# Patient Record
Sex: MALE | Race: BLACK OR AFRICAN AMERICAN | NOT HISPANIC OR LATINO | Employment: OTHER | URBAN - METROPOLITAN AREA
[De-identification: names, ages, dates, MRNs, and addresses within clinical notes are randomized per-mention and may not be internally consistent; named-entity substitution may affect disease eponyms.]

---

## 2017-04-10 ENCOUNTER — OFFICE VISIT (OUTPATIENT)
Dept: FAMILY MEDICINE | Facility: CLINIC | Age: 47
End: 2017-04-10
Payer: COMMERCIAL

## 2017-04-10 VITALS
HEIGHT: 72 IN | DIASTOLIC BLOOD PRESSURE: 110 MMHG | TEMPERATURE: 98 F | HEART RATE: 68 BPM | WEIGHT: 255.5 LBS | SYSTOLIC BLOOD PRESSURE: 156 MMHG | BODY MASS INDEX: 34.61 KG/M2

## 2017-04-10 DIAGNOSIS — I10 ESSENTIAL HYPERTENSION: Primary | ICD-10-CM

## 2017-04-10 PROCEDURE — 99204 OFFICE O/P NEW MOD 45 MIN: CPT | Mod: S$GLB,,, | Performed by: FAMILY MEDICINE

## 2017-04-10 PROCEDURE — 99999 PR PBB SHADOW E&M-EST. PATIENT-LVL III: CPT | Mod: PBBFAC,,, | Performed by: FAMILY MEDICINE

## 2017-04-10 PROCEDURE — 3080F DIAST BP >= 90 MM HG: CPT | Mod: S$GLB,,, | Performed by: FAMILY MEDICINE

## 2017-04-10 PROCEDURE — 3077F SYST BP >= 140 MM HG: CPT | Mod: S$GLB,,, | Performed by: FAMILY MEDICINE

## 2017-04-10 PROCEDURE — 1160F RVW MEDS BY RX/DR IN RCRD: CPT | Mod: S$GLB,,, | Performed by: FAMILY MEDICINE

## 2017-04-10 RX ORDER — LOSARTAN POTASSIUM AND HYDROCHLOROTHIAZIDE 25; 100 MG/1; MG/1
1 TABLET ORAL DAILY
Qty: 90 TABLET | Refills: 3 | Status: ON HOLD | OUTPATIENT
Start: 2017-04-10 | End: 2020-03-26 | Stop reason: HOSPADM

## 2017-04-10 NOTE — PROGRESS NOTES
Subjective:       Patient ID: Chon Abreu is a 46 y.o. male.    Chief Complaint: Blood Pressure Check    HPI Comments: Disclaimer: This note has been generated using voice-recognition software. There may be typographical errors that have been missed during proof-reading    45 yo presents for follow up after hospital discharge and to establish care with me as new PCP.  Previously seen in 2014.  Admitted at [facility] x one day one week ago when he developed positional vertigo which lasted over one hour.  By history had labs, EKG which were normal (no results available for review).  He also had head CT which was reportedly normal  Given prescription for vertigo, which he never started since symptoms resolved  Review of previous visits showed EKG with LVH    Review of Systems   Constitutional: Negative for activity change, appetite change, chills, fatigue, fever and unexpected weight change.   HENT: Negative for congestion, ear discharge, ear pain, hearing loss and sinus pressure.    Eyes: Negative for pain and visual disturbance.   Respiratory: Negative for cough, chest tightness and shortness of breath.    Cardiovascular: Negative for chest pain, palpitations and leg swelling.   Gastrointestinal: Negative for abdominal distention and abdominal pain.   Genitourinary: Negative for difficulty urinating, dysuria, frequency and hematuria.   Musculoskeletal: Negative for arthralgias, back pain, gait problem, joint swelling, myalgias, neck pain and neck stiffness.   Skin: Negative for rash.   Neurological: Negative for dizziness, tremors, speech difficulty, weakness, numbness and headaches.   Psychiatric/Behavioral: Negative for agitation and behavioral problems.       Objective:      Physical Exam   Constitutional: He is oriented to person, place, and time. He appears well-developed and well-nourished.   HENT:   Head: Normocephalic.   Right Ear: Tympanic membrane and external ear normal.   Left Ear: Tympanic  membrane and external ear normal.   Nose: Nose normal.   Mouth/Throat: Uvula is midline, oropharynx is clear and moist and mucous membranes are normal.   Eyes: Conjunctivae and EOM are normal. Pupils are equal, round, and reactive to light.   Neck: Normal range of motion. Neck supple. No JVD present. Carotid bruit is not present. No thyroid mass and no thyromegaly present.   Cardiovascular: Normal rate, regular rhythm and normal heart sounds.    No murmur heard.  Pulmonary/Chest: Effort normal and breath sounds normal. He has no rales.   Abdominal: Soft. Bowel sounds are normal. He exhibits no mass. There is no tenderness.   Genitourinary: Testes normal. Right testis shows no mass.   Musculoskeletal: Normal range of motion. He exhibits no edema.   Lymphadenopathy:     He has no cervical adenopathy.   Neurological: He is alert and oriented to person, place, and time. He has normal reflexes. No cranial nerve deficit.   Skin: Skin is warm. No lesion and no rash noted.   Psychiatric: He has a normal mood and affect.       Assessment:       1.  Essential hypertension  2.  LVH  3.  Transient vertigo  Plan:       1.  Losartan-HCT  2.  Sign release form to obtain Hospital records for review  3.  F/u 3 weeks

## 2017-04-10 NOTE — MR AVS SNAPSHOT
[facility]  [address] [facility], [address]  [facility] [address]  Phone: [phone]  Fax: [phone]                  Chon Abreu   4/10/2017 8:20 AM   Office Visit    Description:  Male : 1970   Provider:  Thee Donato MD   Department:  [facility]           Reason for Visit     Blood Pressure Check           Diagnoses this Visit        Comments    Essential hypertension    -  Primary            To Do List           Goals (5 Years of Data)     None      Follow-Up and Disposition     Return in about 3 weeks (around 2017).       These Medications        Disp Refills Start End    losartan-hydrochlorothiazide 100-25 mg (HYZAAR) 100-25 mg per tablet 90 tablet 3 4/10/2017 4/10/2018    Take 1 tablet by mouth once daily. - Oral    Pharmacy: [facility] [address] [place] [address] [address] [facility] AT [facility] Ph #: [phone]         [facility]     [facility] On Call Nurse Care Line -  Assistance  Unless otherwise directed by your provider, please contact [facility], our nurse care line that is available for  assistance.     Registered nurses in the [facility] provide: appointment scheduling, clinical advisement, health education, and other advisory services.  Call: [phone] (toll free)               Medications           START taking these NEW medications        Refills    losartan-hydrochlorothiazide 100-25 mg (HYZAAR) 100-25 mg per tablet 3    Sig: Take 1 tablet by mouth once daily.    Class: Normal    Route: Oral      STOP taking these medications     naproxen (NAPROSYN) 500 MG tablet Take 1 tablet (500 mg total) by mouth 2 (two) times daily with meals.           Verify that the below list of medications is an accurate representation of the medications you are currently taking.  If none reported, the list may be blank. If incorrect, please contact your healthcare provider. Carry this  "list with you in case of emergency.           Current Medications     losartan-hydrochlorothiazide 100-25 mg (HYZAAR) 100-25 mg per tablet Take 1 tablet by mouth once daily.           Clinical Reference Information           Your Vitals Were     BP Pulse Temp    156/110 (BP Location: Right arm, Patient Position: Sitting, BP Method: Manual) 68 98.1 °F (36.7 °C) (Oral)    Height Weight BMI    5' 11.5" (1.816 m) 115.9 kg (255 lb 8.2 oz) 35.14 kg/m2      Blood Pressure          Most Recent Value    BP  (!)  156/110      Allergies as of 4/10/2017     No Known Allergies      Immunizations Administered on Date of Encounter - 4/10/2017     None      Language Assistance Services     ATTENTION: Language assistance services are available, free of charge. Please call [phone].      ATENCIÓN: Si redd leonardo, tiene a norris disposición servicios gratuitos de asistencia lingüística. Llame al [phone].     CHÚ Ý: N?u b?n nói Ti?ng Vi?t, có các d?ch v? h? tr? ngôn ng? mi?n phí dành cho b?n. G?i s? [phone].         [facility] complies with applicable Federal civil rights laws and does not discriminate on the basis of race, color, national origin, age, disability, or sex.        "

## 2017-05-01 ENCOUNTER — OFFICE VISIT (OUTPATIENT)
Dept: FAMILY MEDICINE | Facility: CLINIC | Age: 47
End: 2017-05-01
Payer: COMMERCIAL

## 2017-05-01 VITALS
DIASTOLIC BLOOD PRESSURE: 100 MMHG | HEART RATE: 72 BPM | HEIGHT: 71 IN | SYSTOLIC BLOOD PRESSURE: 160 MMHG | BODY MASS INDEX: 34.69 KG/M2 | TEMPERATURE: 99 F | WEIGHT: 247.81 LBS

## 2017-05-01 DIAGNOSIS — I10 ESSENTIAL HYPERTENSION: Primary | ICD-10-CM

## 2017-05-01 PROCEDURE — 99999 PR PBB SHADOW E&M-EST. PATIENT-LVL III: CPT | Mod: PBBFAC,,, | Performed by: FAMILY MEDICINE

## 2017-05-01 PROCEDURE — 1160F RVW MEDS BY RX/DR IN RCRD: CPT | Mod: S$GLB,,, | Performed by: FAMILY MEDICINE

## 2017-05-01 PROCEDURE — 3077F SYST BP >= 140 MM HG: CPT | Mod: S$GLB,,, | Performed by: FAMILY MEDICINE

## 2017-05-01 PROCEDURE — 99214 OFFICE O/P EST MOD 30 MIN: CPT | Mod: S$GLB,,, | Performed by: FAMILY MEDICINE

## 2017-05-01 PROCEDURE — 3080F DIAST BP >= 90 MM HG: CPT | Mod: S$GLB,,, | Performed by: FAMILY MEDICINE

## 2017-05-01 RX ORDER — AMLODIPINE BESYLATE 10 MG/1
10 TABLET ORAL DAILY
Qty: 30 TABLET | Refills: 11 | Status: ON HOLD | OUTPATIENT
Start: 2017-05-01 | End: 2020-03-26 | Stop reason: HOSPADM

## 2017-05-01 NOTE — MR AVS SNAPSHOT
[facility]  [address] [facility], [address]  [facility] [address]  Phone: [phone]  Fax: [phone]                  Chon Abreu   2017 9:40 AM   Office Visit    Description:  Male : 1970   Provider:  Thee Donato MD   Department:  [facility]           Reason for Visit     Hypertension           Diagnoses this Visit        Comments    Essential hypertension    -  Primary            To Do List           Goals (5 Years of Data)     None      Follow-Up and Disposition     Return in about 4 weeks (around 2017).       These Medications        Disp Refills Start End    amlodipine (NORVASC) 10 MG tablet 30 tablet 11 2017    Take 1 tablet (10 mg total) by mouth once daily. - Oral    Pharmacy: [facility] [address] [place] [address] AT [facility] #: [phone]         [facility]     [facility] On Call Nurse Care Line -  Assistance  Unless otherwise directed by your provider, please contact [facility], our nurse care line that is available for  assistance.     Registered nurses in the [facility] On Call Center provide: appointment scheduling, clinical advisement, health education, and other advisory services.  Call: [phone] (toll free)               Medications           START taking these NEW medications        Refills    amlodipine (NORVASC) 10 MG tablet 11    Sig: Take 1 tablet (10 mg total) by mouth once daily.    Class: Normal    Route: Oral           Verify that the below list of medications is an accurate representation of the medications you are currently taking.  If none reported, the list may be blank. If incorrect, please contact your healthcare provider. Carry this list with you in case of emergency.           Current Medications     losartan-hydrochlorothiazide 100-25 mg (HYZAAR) 100-25 mg per tablet Take 1 tablet by mouth once daily.    amlodipine  "(NORVASC) 10 MG tablet Take 1 tablet (10 mg total) by mouth once daily.           Clinical Reference Information           Your Vitals Were     BP Pulse Temp    160/100 (BP Location: Right arm, Patient Position: Sitting, BP Method: Manual) 72 98.7 °F (37.1 °C) (Oral)    Height Weight BMI    5' 11" (1.803 m) 112.4 kg (247 lb 12.8 oz) 34.56 kg/m2      Blood Pressure          Most Recent Value    BP  (!)  160/100      Allergies as of 5/1/2017     No Known Allergies      Immunizations Administered on Date of Encounter - 5/1/2017     None      Language Assistance Services     ATTENTION: Language assistance services are available, free of charge. Please call [phone].      ATENCIÓN: Si chandrikala malissa, tiene a norris disposición servicios gratuitos de asistencia lingüística. Llame al [phone].     ELIEL Ý: N?u b?n nói Ti?ng Vi?t, có các d?ch v? h? tr? ngôn ng? mi?n phí dành cho b?n. G?i s? [phone].         [facility] complies with applicable Federal civil rights laws and does not discriminate on the basis of race, color, national origin, age, disability, or sex.        "

## 2017-05-01 NOTE — PROGRESS NOTES
Subjective:       Patient ID: Chon Abreu is a 46 y.o. male.    Chief Complaint: Hypertension (Follow up)    HPI Comments: Disclaimer: This note has been generated using voice-recognition software. There may be typographical errors that have been missed during proof-reading    Pt presents for follow up of hypertension.  Taking medication without side effects.  BP checked 2x since last seen showed systolics around 170s, diastolics in 100s  No copies of prior records available for review    Hypertension   Pertinent negatives include no chest pain, headaches, palpitations or shortness of breath.     Review of Systems   Constitutional: Negative for fatigue and unexpected weight change.   Respiratory: Negative for chest tightness and shortness of breath.    Cardiovascular: Negative for chest pain, palpitations and leg swelling.   Neurological: Negative for dizziness, weakness, light-headedness and headaches.       Objective:      Physical Exam   Constitutional: He appears well-developed and well-nourished. No distress.   Neck: No JVD present. No thyromegaly present.   Cardiovascular: Normal rate and regular rhythm.    No murmur heard.  Pulmonary/Chest: Effort normal and breath sounds normal. He has no wheezes. He has no rales.   Musculoskeletal: He exhibits no edema.   Lymphadenopathy:     He has no cervical adenopathy.       Assessment:       1. Essential hypertension        Plan:       1.  Continue present medications  2.  Amlodipine 10mg daily  3.  Try to obtain prior records for review  4.  Frequent BP checks, then f/u one month

## 2017-05-05 ENCOUNTER — TELEPHONE (OUTPATIENT)
Dept: FAMILY MEDICINE | Facility: CLINIC | Age: 47
End: 2017-05-05

## 2017-05-05 NOTE — TELEPHONE ENCOUNTER
----- Message from Yola Kim LPN sent at 5/1/2017 10:24 AM CDT -----  Call tomorrow to set up nurse visits for b/p checks.

## 2017-05-05 NOTE — TELEPHONE ENCOUNTER
Attempted to contact patient to schedule nurse visits for blood pressure checks. Message left on voicemail instructing patient to return call.

## 2017-05-11 NOTE — TELEPHONE ENCOUNTER
Attempted to contact patient to schedule nurse visits for blood pressure checks. Unable to leave message at number provided.

## 2017-05-11 NOTE — TELEPHONE ENCOUNTER
Message left on voicemail of emergency contact to have patient contact office to schedule nurse visits.

## 2017-10-25 ENCOUNTER — HOSPITAL ENCOUNTER (EMERGENCY)
Facility: OTHER | Age: 47
Discharge: HOME | End: 2017-10-26
Attending: EMERGENCY MEDICINE
Payer: COMMERCIAL

## 2017-10-25 DIAGNOSIS — I10 HYPERTENSION, UNSPECIFIED TYPE: ICD-10-CM

## 2017-10-25 DIAGNOSIS — I10 ESSENTIAL HYPERTENSION: ICD-10-CM

## 2017-10-25 DIAGNOSIS — R42 DIZZINESS: Primary | ICD-10-CM

## 2017-10-25 LAB
ANION GAP SERPL CALC-SCNC: 7 MMOL/L
BASOPHILS # BLD AUTO: 0.01 K/UL
BASOPHILS NFR BLD: 0.3 %
BUN SERPL-MCNC: 17 MG/DL
CALCIUM SERPL-MCNC: 8.9 MG/DL
CHLORIDE SERPL-SCNC: 106 MMOL/L
CO2 SERPL-SCNC: 29 MMOL/L
CREAT SERPL-MCNC: 0.9 MG/DL
DIFFERENTIAL METHOD: ABNORMAL
EOSINOPHIL # BLD AUTO: 0.2 K/UL
EOSINOPHIL NFR BLD: 5.5 %
ERYTHROCYTE [DISTWIDTH] IN BLOOD BY AUTOMATED COUNT: 13.6 %
EST. GFR  (AFRICAN AMERICAN): >60 ML/MIN/1.73 M^2
EST. GFR  (NON AFRICAN AMERICAN): >60 ML/MIN/1.73 M^2
GLUCOSE SERPL-MCNC: 111 MG/DL
HCT VFR BLD AUTO: 44.8 %
HGB BLD-MCNC: 14.6 G/DL
LYMPHOCYTES # BLD AUTO: 1.4 K/UL
LYMPHOCYTES NFR BLD: 39.4 %
MCH RBC QN AUTO: 27.5 PG
MCHC RBC AUTO-ENTMCNC: 32.6 G/DL
MCV RBC AUTO: 84 FL
MONOCYTES # BLD AUTO: 0.3 K/UL
MONOCYTES NFR BLD: 9.3 %
NEUTROPHILS # BLD AUTO: 1.6 K/UL
NEUTROPHILS NFR BLD: 45.5 %
PLATELET # BLD AUTO: 182 K/UL
PMV BLD AUTO: 10.8 FL
POTASSIUM SERPL-SCNC: 3.7 MMOL/L
RBC # BLD AUTO: 5.31 M/UL
SODIUM SERPL-SCNC: 142 MMOL/L
WBC # BLD AUTO: 3.43 K/UL

## 2017-10-25 PROCEDURE — 99283 EMERGENCY DEPT VISIT LOW MDM: CPT

## 2017-10-25 PROCEDURE — 25000003 PHARM REV CODE 250: Performed by: EMERGENCY MEDICINE

## 2017-10-25 PROCEDURE — 85025 COMPLETE CBC W/AUTO DIFF WBC: CPT

## 2017-10-25 PROCEDURE — 80048 BASIC METABOLIC PNL TOTAL CA: CPT

## 2017-10-25 RX ORDER — AMLODIPINE BESYLATE 5 MG/1
10 TABLET ORAL
Status: COMPLETED | OUTPATIENT
Start: 2017-10-25 | End: 2017-10-25

## 2017-10-25 RX ADMIN — AMLODIPINE BESYLATE 10 MG: 5 TABLET ORAL at 11:10

## 2017-10-26 VITALS
HEART RATE: 60 BPM | TEMPERATURE: 98 F | RESPIRATION RATE: 18 BRPM | DIASTOLIC BLOOD PRESSURE: 92 MMHG | WEIGHT: 265 LBS | BODY MASS INDEX: 37.1 KG/M2 | HEIGHT: 71 IN | OXYGEN SATURATION: 96 % | SYSTOLIC BLOOD PRESSURE: 172 MMHG

## 2017-10-26 NOTE — ED TRIAGE NOTES
Pt states he felt an episode of dizziness at about 9:20 pm today, felt like the room was spinning as he was getting out of bed.  Denies n/v, CP, HA and SOB.  Pt states he is not currently dizziness.  Noncompliant with BP medication.

## 2017-10-26 NOTE — ED NOTES
Patient was ambulated per MD request, denies weakness/dizziness, gait steady, no assistance needed.

## 2017-10-26 NOTE — ED PROVIDER NOTES
Encounter Date: 10/25/2017    SCRIBE #1 NOTE: I, Judi Moura , am scribing for, and in the presence of, Dr. Macias.       History     Chief Complaint   Patient presents with    Dizziness     PT CO dizziness since 2100 tonight. states it feels like the room is spinning.     Time seen by provider: 10:49 PM    This is a 46 y.o. male, with history of HTN, who presents with complaint of dizziness that began approximately one hour ago. The patient noticed the room spinning shortly after waking up. He describes dizziness as constant, but notes it resolved prior to arrival. He was started on HTN medication three months ago, but has only taken it once. The patient denies fever, chills, nausea, vomiting, chest pain, leg swelling, SOB, headache, vision disturbance, or gait problem.       The history is provided by the patient.     Review of patient's allergies indicates:  No Known Allergies  Past Medical History:   Diagnosis Date    Hypertension      Past Surgical History:   Procedure Laterality Date    ANTERIOR CRUCIATE LIGAMENT REPAIR  1990    CATARACT EXTRACTION EXTRACAPSULAR W/ INTRAOCULAR LENS IMPLANTATION  2011    OS    KNEE SURGERY Right      Family History   Problem Relation Age of Onset    Hypertension Mother      Social History   Substance Use Topics    Smoking status: Former Smoker     Packs/day: 2.00     Years: 21.00     Types: Cigarettes     Quit date: 1/1/2014    Smokeless tobacco: Never Used    Alcohol use Yes      Comment: occasionally     Review of Systems   Constitutional: Negative for activity change, appetite change, chills, diaphoresis and fever.   HENT: Negative for congestion, sore throat and trouble swallowing.    Eyes: Negative for photophobia and visual disturbance.   Respiratory: Negative for cough, chest tightness and shortness of breath.    Cardiovascular: Negative for chest pain and leg swelling.   Gastrointestinal: Negative for abdominal pain, nausea and vomiting.   Endocrine: Negative  for polydipsia, polyphagia and polyuria.   Genitourinary: Negative for difficulty urinating and flank pain.   Musculoskeletal: Negative for back pain, gait problem and neck pain.   Skin: Negative for pallor.   Neurological: Positive for dizziness (resolved). Negative for weakness and headaches.   Psychiatric/Behavioral: Negative for confusion.       Physical Exam     Initial Vitals [10/25/17 2223]   BP Pulse Resp Temp SpO2   (!) 182/122 72 18 97.4 °F (36.3 °C) 96 %      MAP       142         Physical Exam    Nursing note and vitals reviewed.  Constitutional: He appears well-developed and well-nourished. He is not diaphoretic. No distress.   HENT:   Head: Normocephalic and atraumatic.   Right Ear: External ear normal.   Left Ear: External ear normal.   Eyes: Right eye exhibits no discharge. Left eye exhibits no discharge.   Right eye: Cloudy with chronic changes.   Left eye: PERRL. EOMI.    Neck: Normal range of motion. Neck supple. No tracheal deviation present.   Cardiovascular: Normal rate, regular rhythm, normal heart sounds and intact distal pulses. Exam reveals no gallop and no friction rub.    No murmur heard.  Pulmonary/Chest: Breath sounds normal. No stridor. No respiratory distress. He has no wheezes. He has no rhonchi. He has no rales.   Abdominal: Soft. Bowel sounds are normal. He exhibits no distension. There is no tenderness. There is no rebound and no guarding.   Musculoskeletal: Normal range of motion. He exhibits no edema or tenderness.   No lower extremity edema.    Neurological: He is alert and oriented to person, place, and time. He has normal strength. No cranial nerve deficit.   Cranial nerves II through XII grossly intact.  5/5 motor strength all 4 extremities.  Sensation is normal.  Finger to nose normal.  Gait normal.  Speech and cognition is normal.  No focal neurologic deficit.   Skin: Skin is warm and dry. Capillary refill takes less than 2 seconds. No erythema. No pallor.   Psychiatric:  He has a normal mood and affect. Thought content normal.         ED Course   Procedures  Labs Reviewed - No data to display          Medical Decision Making:   Clinical Tests:   Lab Tests: Ordered and Reviewed  ED Management:  Well-appearing patient presents with transient dizziness upon waking tonight.  He is unable to clarify between lightheadedness and vertiginous, but reports it were similar symptoms to both.  He is no longer having the symptoms.  He has a completely normal exam.  He is hypertensive on presentation.  Reports he is not taking his blood pressure medicine because he did not like it and he did not trust his doctor.  Screening labwork and EKG for end organ damage reveals none.  Patient's blood pressure improves with the dose of his home amlodipine.  Encouraged follow-up with primary care discuss alternative blood pressure regime if he does not like his current one.    I did have an extensive talk regarding signs to return for and need for follow up. Patient expressed understanding and will monitor symptoms closely and follow-up as needed.    JAZMINE Macias M.D.  10/26/2017  3:53 AM                    I, Dr. Grzegorz Macias, personally performed the services described in this documentation. All medical record entries made by the scribe were at my direction and in my presence.  I have reviewed the chart and agree that the record reflects my personal performance and is accurate and complete. Grzegorz Macias MD.  3:54 AM 10/26/2017  ED Course      Clinical Impression:     1. Dizziness    2. Hypertension, unspecified type    3. Essential hypertension                                 Grzegorz Macias MD  10/26/17 0354

## 2018-09-17 ENCOUNTER — OFFICE VISIT (OUTPATIENT)
Dept: OTOLARYNGOLOGY | Facility: CLINIC | Age: 48
End: 2018-09-17
Payer: COMMERCIAL

## 2018-09-17 VITALS
HEART RATE: 78 BPM | WEIGHT: 267.5 LBS | BODY MASS INDEX: 37.45 KG/M2 | SYSTOLIC BLOOD PRESSURE: 140 MMHG | HEIGHT: 71 IN | DIASTOLIC BLOOD PRESSURE: 104 MMHG | TEMPERATURE: 99 F

## 2018-09-17 DIAGNOSIS — H61.23 BILATERAL IMPACTED CERUMEN: ICD-10-CM

## 2018-09-17 DIAGNOSIS — H93.8X3 EAR FULLNESS, BILATERAL: ICD-10-CM

## 2018-09-17 DIAGNOSIS — R03.0 ELEVATED BLOOD PRESSURE READING: ICD-10-CM

## 2018-09-17 PROCEDURE — 3080F DIAST BP >= 90 MM HG: CPT | Mod: CPTII,S$GLB,, | Performed by: OTOLARYNGOLOGY

## 2018-09-17 PROCEDURE — 99204 OFFICE O/P NEW MOD 45 MIN: CPT | Mod: 25,S$GLB,, | Performed by: OTOLARYNGOLOGY

## 2018-09-17 PROCEDURE — 3008F BODY MASS INDEX DOCD: CPT | Mod: CPTII,S$GLB,, | Performed by: OTOLARYNGOLOGY

## 2018-09-17 PROCEDURE — 69210 REMOVE IMPACTED EAR WAX UNI: CPT | Mod: S$GLB,,, | Performed by: OTOLARYNGOLOGY

## 2018-09-17 PROCEDURE — 3077F SYST BP >= 140 MM HG: CPT | Mod: CPTII,S$GLB,, | Performed by: OTOLARYNGOLOGY

## 2018-09-17 RX ORDER — OFLOXACIN 3 MG/ML
5 SOLUTION AURICULAR (OTIC) 2 TIMES DAILY
Qty: 100 DROP | Refills: 0 | Status: SHIPPED | OUTPATIENT
Start: 2018-09-17 | End: 2018-09-27

## 2018-09-17 NOTE — PATIENT INSTRUCTIONS
Use prescription ear drops as discussed.  Follow up if any residual symptoms in 2 weeks and otherwise in 6 months unless problems prior.  Do not insert Q-tips, etc into ears.    FOLLOW UP BLOOD PRESSURE WITH PCP.

## 2018-09-18 NOTE — PROGRESS NOTES
Subjective:       Patient ID: Chon Abreu is a 47 y.o. male.    Chief Complaint: Ear Fullness (both ears stopped about 2 months)    He is a new patient for me here today complaining of ear blockage bilaterally for the past few months.  He reports a history of wax buildup in the past requiring periodic cleaning with the last time being about 2 years ago.  He denies using any drops or other interventions.  He states he occasionally uses Q-Tips but no ear inserts.  He denies otalgia or otorrhea.  He denies any other prior otologic history.  He denies any nasal or throat complaints or other otolaryngologic complaints.  There is no history of tobacco use.            Review of Systems   Ears: Positive for hearing loss.  Negative for ear pain, ear pressure, ringing in ear, ear discharge, ear infections, dizziness, head trauma, taken gentramycin/streptomycin and family history of hearing loss.    Nose:  Negative for nosebleeds, nasal obstruction, nasal or sinus surgery, loss of smell, postnasal drip and snoring.    Mouth/Throat: Negative for pain swallowing, impaired swallowing, hoarse voice, throat mass, neck mass, oral ulcers and neck lumps.   Constitutional: Negative for recent unexplained weight loss, fever, chills and night sweats.    Eyes:  Negative for history of glaucoma and visual change.   Cardiovascular:  Positive for history of high blood pressure. Negative for chest pain and palpitations.   Respiratory:  Negative for asthma, emphysema, history of tuberculosis, recent cough and shortness of breath.    Gastrointestinal:  Negative for history of stomach ulcers or pain, acid reflux, indigestion, blood in stool and change in stool.   Other:  Negative for kidney problem, bladder problem, prostate disease, arthritis, new or changing moles, weakness, disturbances in coordination, slurred, confusion, swollen glands, anemia and persistent infections.           Objective:        Vitals:    09/17/18 1042   BP: (!)  140/104   Pulse: 78   Temp: 99 °F (37.2 °C)     Body mass index is 37.31 kg/m².  Physical Exam   Constitutional: He is oriented to person, place, and time. He appears well-developed and well-nourished. No distress.   HENT:   Head: Normocephalic and atraumatic.   Right Ear: External ear normal.   Left Ear: External ear normal.   Nose: No mucosal edema, rhinorrhea or nasal deformity. No epistaxis.   Mouth/Throat: Uvula is midline, oropharynx is clear and moist and mucous membranes are normal. No oral lesions. No trismus in the jaw. No uvula swelling. No oropharyngeal exudate, posterior oropharyngeal edema or posterior oropharyngeal erythema.   Dense medial cerumen impactions are noted bilaterally.   Neck: Normal range of motion and phonation normal. Neck supple. No tracheal deviation present. No thyromegaly present.   Pulmonary/Chest: Effort normal. No respiratory distress.   Lymphadenopathy:     He has no cervical adenopathy.   Neurological: He is alert and oriented to person, place, and time. He displays no weakness.   Skin: Skin is warm and dry.   Psychiatric: He has a normal mood and affect. His behavior is normal. His speech is not slurred.       Tests / Results:        Assessment:       1. Ear fullness, bilateral    2. Bilateral impacted cerumen    3. Elevated blood pressure reading        Plan:        Ears cleaned as per procedure note.  See procedure note.    Generic Floxin drops in both ears twice daily for the next 7 days.  Do not use Q-Tips or other inserts in the ears.  Follow-up if residual symptoms in 2 weeks and in 6 months unless change or problems prior.    Discussed significantly elevated blood pressure with patient and associated significant risks and needs to see PCP as soon as possible and if not available, then urgent care.  States he understands and will comply.  Offered to make appointment for him with one of the primary care physicians next door.  States he prefers to discuss with his work  colleagues to refer to one of their PCPs.

## 2018-09-19 NOTE — PROCEDURES
Ear Cerumen Removal  Date/Time: 9/17/2018 7:00 PM  Performed by: Xenia Chew MD  Authorized by: Xenia Chew MD     Consent Done?:  Yes (Verbal)  Location details:  Both ears  Procedure type: curette    Cerumen  Removal Results:  Cerumen completely removed  Patient tolerance:  Patient tolerated the procedure well with no immediate complications     Dense medial cerumen impactions are present bilaterally requiring multiple rounds of curettage, suction, [place] washes to finally completely clear.  There is mild irritation upon completion of the procedure but otherwise the canals and tympanic membranes are within normal limits and denies associated or residual symptoms.  He tolerated procedure well and reports immediate significant improvement in his hearing and resolution of ear blockage bilaterally.

## 2018-09-24 ENCOUNTER — CLINICAL SUPPORT (OUTPATIENT)
Dept: OTOLARYNGOLOGY | Facility: CLINIC | Age: 48
End: 2018-09-24
Payer: COMMERCIAL

## 2018-09-24 DIAGNOSIS — R29.2 ABNORMAL ACOUSTIC REFLEX: Primary | ICD-10-CM

## 2018-09-24 PROCEDURE — 92550 TYMPANOMETRY & REFLEX THRESH: CPT | Mod: S$GLB,,, | Performed by: AUDIOLOGIST-HEARING AID FITTER

## 2018-09-24 PROCEDURE — 92557 COMPREHENSIVE HEARING TEST: CPT | Mod: S$GLB,,, | Performed by: AUDIOLOGIST-HEARING AID FITTER

## 2018-09-25 NOTE — PROGRESS NOTES
Julius Wright, CCC-A  Audiologist - [facility] [address] [place] [address]  [email or web address]  [phone]    Patient: Chon Abreu   MRN: [id number]  : 1970  PEREZ: 2018      AUDIOLOGICAL EVALUATION        IMPRESSION:   Audiological testing indicated that Chon Abreu has normal hearing in both ears.    RECOMMENDATIONS:   It is recommended that he:  Use precaution and/or hearing protection in noisy environments.    If you should have any questions or concerns regarding the above information, please do not hesitate to contact me at [phone].      _______________________________  Julius Wright, KATY-A  Audiologist

## 2019-05-02 ENCOUNTER — TELEPHONE (OUTPATIENT)
Dept: FAMILY MEDICINE | Facility: CLINIC | Age: 49
End: 2019-05-02

## 2020-03-18 ENCOUNTER — HOSPITAL ENCOUNTER (EMERGENCY)
Facility: OTHER | Age: 50
Discharge: HOME | End: 2020-03-18
Attending: EMERGENCY MEDICINE
Payer: COMMERCIAL

## 2020-03-18 VITALS
TEMPERATURE: 100 F | DIASTOLIC BLOOD PRESSURE: 73 MMHG | HEART RATE: 86 BPM | OXYGEN SATURATION: 95 % | SYSTOLIC BLOOD PRESSURE: 103 MMHG | HEIGHT: 71 IN | BODY MASS INDEX: 37.1 KG/M2 | RESPIRATION RATE: 18 BRPM | WEIGHT: 265 LBS

## 2020-03-18 DIAGNOSIS — R05.9 COUGH: ICD-10-CM

## 2020-03-18 DIAGNOSIS — J18.9 PNEUMONIA OF BOTH LUNGS DUE TO INFECTIOUS ORGANISM, UNSPECIFIED PART OF LUNG: Primary | ICD-10-CM

## 2020-03-18 LAB
ALBUMIN SERPL BCP-MCNC: 3.8 G/DL (ref 3.5–5.2)
ALP SERPL-CCNC: 77 U/L (ref 55–135)
ALT SERPL W/O P-5'-P-CCNC: 25 U/L (ref 10–44)
ANION GAP SERPL CALC-SCNC: 14 MMOL/L (ref 8–16)
AST SERPL-CCNC: 17 U/L (ref 10–40)
BASOPHILS # BLD AUTO: 0 K/UL (ref 0–0.2)
BASOPHILS NFR BLD: 0 % (ref 0–1.9)
BILIRUB SERPL-MCNC: 0.6 MG/DL (ref 0.1–1)
BUN SERPL-MCNC: 14 MG/DL (ref 6–20)
CALCIUM SERPL-MCNC: 8.5 MG/DL (ref 8.7–10.5)
CHLORIDE SERPL-SCNC: 103 MMOL/L (ref 95–110)
CO2 SERPL-SCNC: 21 MMOL/L (ref 23–29)
CREAT SERPL-MCNC: 1.2 MG/DL (ref 0.5–1.4)
CRP SERPL-MCNC: 23.7 MG/L (ref 0–8.2)
CTP QC/QA: YES
DIFFERENTIAL METHOD: ABNORMAL
EOSINOPHIL # BLD AUTO: 0 K/UL (ref 0–0.5)
EOSINOPHIL NFR BLD: 0 % (ref 0–8)
ERYTHROCYTE [DISTWIDTH] IN BLOOD BY AUTOMATED COUNT: 13.8 % (ref 11.5–14.5)
EST. GFR  (AFRICAN AMERICAN): >60 ML/MIN/1.73 M^2
EST. GFR  (NON AFRICAN AMERICAN): >60 ML/MIN/1.73 M^2
FERRITIN SERPL-MCNC: 463 NG/ML (ref 20–300)
GLUCOSE SERPL-MCNC: 101 MG/DL (ref 70–110)
HCT VFR BLD AUTO: 51.6 % (ref 40–54)
HGB BLD-MCNC: 15.6 G/DL (ref 14–18)
IMM GRANULOCYTES # BLD AUTO: 0.01 K/UL (ref 0–0.04)
IMM GRANULOCYTES NFR BLD AUTO: 0.3 % (ref 0–0.5)
LYMPHOCYTES # BLD AUTO: 0.5 K/UL (ref 1–4.8)
LYMPHOCYTES NFR BLD: 12 % (ref 18–48)
MCH RBC QN AUTO: 26.3 PG (ref 27–31)
MCHC RBC AUTO-ENTMCNC: 30.2 G/DL (ref 32–36)
MCV RBC AUTO: 87 FL (ref 82–98)
MONOCYTES # BLD AUTO: 0.4 K/UL (ref 0.3–1)
MONOCYTES NFR BLD: 9.4 % (ref 4–15)
NEUTROPHILS # BLD AUTO: 3.1 K/UL (ref 1.8–7.7)
NEUTROPHILS NFR BLD: 78.3 % (ref 38–73)
NRBC BLD-RTO: 0 /100 WBC
PLATELET # BLD AUTO: 164 K/UL (ref 150–350)
PMV BLD AUTO: 10.6 FL (ref 9.2–12.9)
POC MOLECULAR INFLUENZA A AGN: NEGATIVE
POC MOLECULAR INFLUENZA B AGN: NEGATIVE
POTASSIUM SERPL-SCNC: 3.5 MMOL/L (ref 3.5–5.1)
PROT SERPL-MCNC: 8.2 G/DL (ref 6–8.4)
RBC # BLD AUTO: 5.94 M/UL (ref 4.6–6.2)
SODIUM SERPL-SCNC: 138 MMOL/L (ref 136–145)
WBC # BLD AUTO: 3.93 K/UL (ref 3.9–12.7)

## 2020-03-18 PROCEDURE — U0002 COVID-19 LAB TEST NON-CDC: HCPCS

## 2020-03-18 PROCEDURE — 25000003 PHARM REV CODE 250: Performed by: PHYSICIAN ASSISTANT

## 2020-03-18 PROCEDURE — 82728 ASSAY OF FERRITIN: CPT

## 2020-03-18 PROCEDURE — 99284 EMERGENCY DEPT VISIT MOD MDM: CPT | Mod: 25

## 2020-03-18 PROCEDURE — 85025 COMPLETE CBC W/AUTO DIFF WBC: CPT

## 2020-03-18 PROCEDURE — 80053 COMPREHEN METABOLIC PANEL: CPT

## 2020-03-18 PROCEDURE — 86140 C-REACTIVE PROTEIN: CPT

## 2020-03-18 RX ORDER — CETIRIZINE HYDROCHLORIDE 10 MG/1
10 TABLET ORAL DAILY
Qty: 30 TABLET | Refills: 0 | Status: ON HOLD | OUTPATIENT
Start: 2020-03-18 | End: 2020-03-26 | Stop reason: HOSPADM

## 2020-03-18 RX ORDER — GUAIFENESIN AND DEXTROMETHORPHAN HYDROBROMIDE 1200; 60 MG/1; MG/1
1 TABLET, EXTENDED RELEASE ORAL 2 TIMES DAILY PRN
Qty: 30 TABLET | Refills: 0 | Status: ON HOLD | OUTPATIENT
Start: 2020-03-18 | End: 2020-03-26 | Stop reason: HOSPADM

## 2020-03-18 RX ORDER — ACETAMINOPHEN 500 MG
1000 TABLET ORAL
Status: COMPLETED | OUTPATIENT
Start: 2020-03-18 | End: 2020-03-18

## 2020-03-18 RX ORDER — ONDANSETRON 4 MG/1
4 TABLET, ORALLY DISINTEGRATING ORAL EVERY 8 HOURS PRN
Qty: 30 TABLET | Refills: 0 | Status: SHIPPED | OUTPATIENT
Start: 2020-03-18

## 2020-03-18 RX ORDER — ACETAMINOPHEN 500 MG
1000 TABLET ORAL EVERY 6 HOURS PRN
Qty: 30 TABLET | Refills: 0 | Status: SHIPPED | OUTPATIENT
Start: 2020-03-18

## 2020-03-18 RX ADMIN — ACETAMINOPHEN 1000 MG: 500 TABLET ORAL at 01:03

## 2020-03-18 NOTE — ED PROVIDER NOTES
"CHIEF COMPLAINT:   Chief Complaint   Patient presents with    Fever     Patient reports fever, chills, fatigue and neck and back pain for 2 days.  Patient reports taking Nyquil with no relief.  Patient denies chest pain and sob.       HISTORY OF PRESENT ILLNESS: Chon Abreu who is a 49 y.o. presents to the emergency department today with complaint of general illness symptoms for the past 2 days.  He does report subjective fever, chills, fatigue and generalized body aches with prevalence to the upper back.  Patient reports some cough however denies any shortness of breath, chest pain, abdominal pain, vomiting or rash.  He has not tried any medications for the symptoms    REVIEW OF SYSTEMS:  Constitutional: +fever, +chills,+ fatigue.  Eyes: No discharge. No pain.  HENT: +nasal congestion; No sore throat.   Cardiovascular: No chest pain, no palpitations.  Respiratory: +cough, no shortness of breath.  Gastrointestinal:  No nausea, no diarrhea; No abdominal pain, no vomiting.   Genitourinary: No hematuria, dysuria, urgency.  Musculoskeletal:  Generalized body aches  Skin: No rashes, no lesions.  Neurological: No headache, no focal weakness.    Otherwise remaining ROS negative     ALLERGIES REVIEWED  MEDICATIONS REVIEWED  PMH/PSH/SOC/FH REVIEWED     The history is provided by the patient.    Nursing/Ancillary staff note reviewed.        PHYSICAL EXAM:  VS reviewed  Vitals:    03/18/20 1257 03/18/20 1320 03/18/20 1408   BP: (!) 140/88  108/62   BP Location: Right arm  Left arm   Patient Position: Sitting  Sitting   Pulse: (!) 122  105   Resp: 19  17   Temp: 100.3 °F (37.9 °C) 100.3 °F (37.9 °C) (!) 103.1 °F (39.5 °C)   TempSrc: Oral  Oral   SpO2: 95%  (!) 94%   Weight: 120.2 kg (265 lb)     Height: 5' 11" (1.803 m)           General Appearance: The patient is alert, appears ill however has no immediate or signs of toxicity. No acute distress.    HEENT: Eyes: Pupils equal; Extra ocular movements intact. No drainage. "   Neck:Neck is supple non-tender. No lymphadenopathy. No stridor.   Respiratory: There are no retractions, lungs are clear to auscultation. No wheezing, no crackles. Chest wall nontender to palpation.   Cardiovascular:  Tachycardia. No murmurs, rubs or gallops.  Gastrointestinal:  Abdomen is soft and non-tender, No guarding, no rebound.  No pulsatile mass.   Neurological: Alert and oriented x 4. No focal weakness. Strength intact 5/5 bilaterally in upper and lower extremities.   Skin: Warm and dry, no rashes.  Musculoskeletal: Extremities are non-tender, non-swollen and have full range of motion.      Past Medical History:   Diagnosis Date    Hypertension          Past Surgical History:   Procedure Laterality Date    ANTERIOR CRUCIATE LIGAMENT REPAIR  1990    CATARACT EXTRACTION EXTRACAPSULAR W/ INTRAOCULAR LENS IMPLANTATION  2011    OS    KNEE SURGERY Right                 ED COURSE:     Patient presenting with general illness symptoms; appears ill however nontoxic.  Patient does appear diaphoretic.  Lungs CTA; heart with some tachycardia however normal rhythm.    DIFFERENTIAL DIAGNOSIS: After history and physical exam a differential diagnosis was considered, but was not limited to,   Sepsis, meningitis, otitis media/external, nasal polyp, bacterial sinusitis, allergic rhinitis, influenza, COVID19, bacterial/viral pharyngitis, bacterial/viral pneumonia.    ED management: Patient seen for a viral-like illness, patient had a negative flu Patient's oxygen saturation noted to be 95 with ambulatory trial with the lowest at 93%.  Chest x-ray was obtained which does reveal bilateral pneumonia consistent with COVID19.  Given these findings and increase in fever; labs were obtained.  Lab notable for lymphopenia and mildly elevated CRP.  Due to the most recent recommendations from our hospital administrations/infectious disease at this time, the patient will be swabbed for COVID 19. We are currently advised that it will  take several days to result, and discussed with the patient the need to self quarantine at home until this result is negative. Reinforced this advice and the dangers failing to comply presents to the public. Symptomatic treatment in the interim. Work note for two weeks was provided. Return precautions discussed. Vital signs did not indicate sepsis at this time however did caution signs and symptoms to watch for for prompt return to the ED for any worsening symptoms.  He did verbalize understanding.        IMPRESSION  The primary encounter diagnosis was Pneumonia of both lungs due to infectious organism, unspecified part of lung. A diagnosis of Cough was also pertinent to this visit.  Discharged with Zyrtec, Mucinex DM, Zofran and Tylenol. Strict instructions to follow up with primary care physician or reference provided for further assessment and evaluation. Given instructions to return for any acute symptoms and verbalized understanding of this medical plan.                                   ROHIT Andujar  03/18/20 3786

## 2020-03-20 LAB — SARS-COV-2 RNA RESP QL NAA+PROBE: DETECTED

## 2020-03-24 ENCOUNTER — HOSPITAL ENCOUNTER (INPATIENT)
Facility: OTHER | Age: 50
LOS: 2 days | Discharge: HOME | DRG: 195 | End: 2020-03-26
Attending: EMERGENCY MEDICINE | Admitting: EMERGENCY MEDICINE
Payer: COMMERCIAL

## 2020-03-24 DIAGNOSIS — R06.02 SHORTNESS OF BREATH: ICD-10-CM

## 2020-03-24 DIAGNOSIS — R09.02 HYPOXIA: Primary | ICD-10-CM

## 2020-03-24 DIAGNOSIS — R74.8 ELEVATED LIVER ENZYMES: ICD-10-CM

## 2020-03-24 DIAGNOSIS — I10 ESSENTIAL HYPERTENSION: ICD-10-CM

## 2020-03-24 DIAGNOSIS — J12.82 PNEUMONIA DUE TO COVID-19 VIRUS: ICD-10-CM

## 2020-03-24 DIAGNOSIS — U07.1 COVID-19 VIRUS INFECTION: ICD-10-CM

## 2020-03-24 DIAGNOSIS — R06.02 SOB (SHORTNESS OF BREATH): ICD-10-CM

## 2020-03-24 DIAGNOSIS — R79.82 ELEVATED C-REACTIVE PROTEIN (CRP): ICD-10-CM

## 2020-03-24 DIAGNOSIS — U07.1 PNEUMONIA DUE TO COVID-19 VIRUS: ICD-10-CM

## 2020-03-24 LAB
ALBUMIN SERPL BCP-MCNC: 2.9 G/DL (ref 3.5–5.2)
ALP SERPL-CCNC: 78 U/L (ref 55–135)
ALT SERPL W/O P-5'-P-CCNC: 55 U/L (ref 10–44)
ANION GAP SERPL CALC-SCNC: 14 MMOL/L (ref 8–16)
AST SERPL-CCNC: 44 U/L (ref 10–40)
BASOPHILS # BLD AUTO: 0.01 K/UL (ref 0–0.2)
BASOPHILS NFR BLD: 0.1 % (ref 0–1.9)
BILIRUB SERPL-MCNC: 1.1 MG/DL (ref 0.1–1)
BUN SERPL-MCNC: 20 MG/DL (ref 6–20)
CALCIUM SERPL-MCNC: 8.8 MG/DL (ref 8.7–10.5)
CHLORIDE SERPL-SCNC: 100 MMOL/L (ref 95–110)
CO2 SERPL-SCNC: 25 MMOL/L (ref 23–29)
CREAT SERPL-MCNC: 1 MG/DL (ref 0.5–1.4)
CRP SERPL-MCNC: 135 MG/L (ref 0–8.2)
DIFFERENTIAL METHOD: ABNORMAL
EOSINOPHIL # BLD AUTO: 0 K/UL (ref 0–0.5)
EOSINOPHIL NFR BLD: 0.4 % (ref 0–8)
ERYTHROCYTE [DISTWIDTH] IN BLOOD BY AUTOMATED COUNT: 13.3 % (ref 11.5–14.5)
EST. GFR  (AFRICAN AMERICAN): >60 ML/MIN/1.73 M^2
EST. GFR  (NON AFRICAN AMERICAN): >60 ML/MIN/1.73 M^2
GLUCOSE SERPL-MCNC: 108 MG/DL (ref 70–110)
HCT VFR BLD AUTO: 47.7 % (ref 40–54)
HGB BLD-MCNC: 15.2 G/DL (ref 14–18)
IMM GRANULOCYTES # BLD AUTO: 0.04 K/UL (ref 0–0.04)
IMM GRANULOCYTES NFR BLD AUTO: 0.6 % (ref 0–0.5)
LYMPHOCYTES # BLD AUTO: 0.4 K/UL (ref 1–4.8)
LYMPHOCYTES NFR BLD: 6.1 % (ref 18–48)
MCH RBC QN AUTO: 26.3 PG (ref 27–31)
MCHC RBC AUTO-ENTMCNC: 31.9 G/DL (ref 32–36)
MCV RBC AUTO: 83 FL (ref 82–98)
MONOCYTES # BLD AUTO: 0.4 K/UL (ref 0.3–1)
MONOCYTES NFR BLD: 5.3 % (ref 4–15)
NEUTROPHILS # BLD AUTO: 5.9 K/UL (ref 1.8–7.7)
NEUTROPHILS NFR BLD: 87.5 % (ref 38–73)
NRBC BLD-RTO: 0 /100 WBC
PLATELET # BLD AUTO: 246 K/UL (ref 150–350)
PMV BLD AUTO: 10.3 FL (ref 9.2–12.9)
POTASSIUM SERPL-SCNC: 3.8 MMOL/L (ref 3.5–5.1)
PROCALCITONIN SERPL IA-MCNC: 0.1 NG/ML
PROT SERPL-MCNC: 8.2 G/DL (ref 6–8.4)
RBC # BLD AUTO: 5.78 M/UL (ref 4.6–6.2)
SODIUM SERPL-SCNC: 139 MMOL/L (ref 136–145)
WBC # BLD AUTO: 6.77 K/UL (ref 3.9–12.7)

## 2020-03-24 PROCEDURE — 36415 COLL VENOUS BLD VENIPUNCTURE: CPT

## 2020-03-24 PROCEDURE — 93005 ELECTROCARDIOGRAM TRACING: CPT

## 2020-03-24 PROCEDURE — 93010 EKG 12-LEAD: ICD-10-PCS | Mod: ,,, | Performed by: INTERNAL MEDICINE

## 2020-03-24 PROCEDURE — 96360 HYDRATION IV INFUSION INIT: CPT

## 2020-03-24 PROCEDURE — 93010 ELECTROCARDIOGRAM REPORT: CPT | Mod: ,,, | Performed by: INTERNAL MEDICINE

## 2020-03-24 PROCEDURE — 80053 COMPREHEN METABOLIC PANEL: CPT

## 2020-03-24 PROCEDURE — 85025 COMPLETE CBC W/AUTO DIFF WBC: CPT

## 2020-03-24 PROCEDURE — 99285 EMERGENCY DEPT VISIT HI MDM: CPT | Mod: 25

## 2020-03-24 PROCEDURE — 25000003 PHARM REV CODE 250: Performed by: EMERGENCY MEDICINE

## 2020-03-24 PROCEDURE — 12000002 HC ACUTE/MED SURGE SEMI-PRIVATE ROOM

## 2020-03-24 PROCEDURE — 63600175 PHARM REV CODE 636 W HCPCS: Performed by: EMERGENCY MEDICINE

## 2020-03-24 PROCEDURE — 84145 PROCALCITONIN (PCT): CPT

## 2020-03-24 PROCEDURE — 86140 C-REACTIVE PROTEIN: CPT

## 2020-03-24 RX ORDER — LOSARTAN POTASSIUM AND HYDROCHLOROTHIAZIDE 25; 100 MG/1; MG/1
1 TABLET ORAL DAILY
Status: CANCELLED | OUTPATIENT
Start: 2020-03-25

## 2020-03-24 RX ORDER — AMLODIPINE BESYLATE 5 MG/1
10 TABLET ORAL DAILY
Status: CANCELLED | OUTPATIENT
Start: 2020-03-25

## 2020-03-24 RX ORDER — ACETAMINOPHEN 500 MG
1000 TABLET ORAL
Status: COMPLETED | OUTPATIENT
Start: 2020-03-24 | End: 2020-03-24

## 2020-03-24 RX ADMIN — ACETAMINOPHEN 1000 MG: 500 TABLET ORAL at 09:03

## 2020-03-24 RX ADMIN — SODIUM CHLORIDE 1000 ML: 0.9 INJECTION, SOLUTION INTRAVENOUS at 09:03

## 2020-03-25 ENCOUNTER — TELEPHONE (OUTPATIENT)
Dept: INTERNAL MEDICINE | Facility: CLINIC | Age: 50
End: 2020-03-25

## 2020-03-25 PROCEDURE — 99223 PR INITIAL HOSPITAL CARE,LEVL III: ICD-10-PCS | Mod: ,,, | Performed by: NURSE PRACTITIONER

## 2020-03-25 PROCEDURE — 99233 PR SUBSEQUENT HOSPITAL CARE,LEVL III: ICD-10-PCS | Mod: ,,, | Performed by: HOSPITALIST

## 2020-03-25 PROCEDURE — 99223 1ST HOSP IP/OBS HIGH 75: CPT | Mod: ,,, | Performed by: NURSE PRACTITIONER

## 2020-03-25 PROCEDURE — 99233 SBSQ HOSP IP/OBS HIGH 50: CPT | Mod: ,,, | Performed by: HOSPITALIST

## 2020-03-25 PROCEDURE — 63600175 PHARM REV CODE 636 W HCPCS: Performed by: HOSPITALIST

## 2020-03-25 PROCEDURE — 11000001 HC ACUTE MED/SURG PRIVATE ROOM

## 2020-03-25 RX ORDER — SODIUM CHLORIDE 0.9 % (FLUSH) 0.9 %
10 SYRINGE (ML) INJECTION
Status: DISCONTINUED | OUTPATIENT
Start: 2020-03-25 | End: 2020-03-26 | Stop reason: HOSPADM

## 2020-03-25 RX ADMIN — AZITHROMYCIN MONOHYDRATE 500 MG: 500 INJECTION, POWDER, LYOPHILIZED, FOR SOLUTION INTRAVENOUS at 10:03

## 2020-03-25 NOTE — HOSPITAL COURSE
Patient was admitted on IV azithromycin and continued on oxygen.  He had a productive cough and initially was very short of breath.  His blood pressure was somewhat elevated sometimes but usually was normal, and he reported he was no longer taking medication for HTN.  He should follow up with his PCP after his quarantine is complete to determine the need to treat his HTN.  He was afebrile on discharge and was no longer requiring supplemental oxygen.  He was given information regarding his expected length of quarantine prior to discharge.

## 2020-03-25 NOTE — ASSESSMENT & PLAN NOTE
- Takes amlodipine, losartan/HCT  - Normotensive on presentation and meds held.  Resume if BP increases.

## 2020-03-25 NOTE — SUBJECTIVE & OBJECTIVE
Past Medical History:   Diagnosis Date    Hypertension        Past Surgical History:   Procedure Laterality Date    ANTERIOR CRUCIATE LIGAMENT REPAIR  1990    CATARACT EXTRACTION EXTRACAPSULAR W/ INTRAOCULAR LENS IMPLANTATION  2011    OS    KNEE SURGERY Right        Review of patient's allergies indicates:  No Known Allergies    Medications:  Medications Prior to Admission   Medication Sig    acetaminophen (TYLENOL) 500 MG tablet Take 2 tablets (1,000 mg total) by mouth every 6 (six) hours as needed for Pain or Temperature greater than (100.4).    amlodipine (NORVASC) 10 MG tablet Take 1 tablet (10 mg total) by mouth once daily.    cetirizine (ZYRTEC) 10 MG tablet Take 1 tablet (10 mg total) by mouth once daily.    dextromethorphan-guaifenesin (MUCINEX DM) 60-1,200 mg per 12 hr tablet Take 1 tablet by mouth 2 (two) times daily as needed.    losartan-hydrochlorothiazide 100-25 mg (HYZAAR) 100-25 mg per tablet Take 1 tablet by mouth once daily.    ondansetron (ZOFRAN-ODT) 4 MG TbDL Take 1 tablet (4 mg total) by mouth every 8 (eight) hours as needed.     Antibiotics (From admission, onward)    Start     Stop Route Frequency Ordered    03/25/20 1015  azithromycin 500 mg in dextrose 5 % 250 mL IVPB (ready to mix system)      03/28 1014 IV Every 24 hours (non-standard times) 03/25/20 0906        Antifungals (From admission, onward)    None        Antivirals (From admission, onward)    None             There is no immunization history on file for this patient.    Family History     Problem Relation (Age of Onset)    Hypertension Mother        Social History     Socioeconomic History    Marital status:      Spouse name: Not on file    Number of children: Not on file    Years of education: Not on file    Highest education level: Not on file   Occupational History    Not on file   Social Needs    Financial resource strain: Not on file    Food insecurity:     Worry: Not on file     Inability: Not on  file    Transportation needs:     Medical: Not on file     Non-medical: Not on file   Tobacco Use    Smoking status: Former Smoker     Packs/day: 2.00     Years: 21.00     Pack years: 42.00     Types: Cigarettes     Last attempt to quit: 2014     Years since quittin.2    Smokeless tobacco: Never Used   Substance and Sexual Activity    Alcohol use: Yes     Comment: occasionally    Drug use: No    Sexual activity: Yes     Partners: Female     Birth control/protection: None   Lifestyle    Physical activity:     Days per week: Not on file     Minutes per session: Not on file    Stress: Not on file   Relationships    Social connections:     Talks on phone: Not on file     Gets together: Not on file     Attends Ten[employer] service: Not on file     Active member of club or organization: Not on file     Attends meetings of clubs or organizations: Not on file     Relationship status: Not on file   Other Topics Concern    Not on file   Social History Narrative    The patient does not exercise regularly ().    Rates diet as poor.    He is not satisfied with weight.         Review of Systems  Objective:     Vital Signs (Most Recent):  Temp: (!) 100.7 °F (38.2 °C) (20 0842)  Pulse: 86 (20 0842)  Resp: 19 (20 0842)  BP: 127/77 (20 0842)  SpO2: (!) 92 % (20 0842) Vital Signs (24h Range):  Temp:  [97.5 °F (36.4 °C)-101.5 °F (38.6 °C)] 100.7 °F (38.2 °C)  Pulse:  [66-99] 86  Resp:  [17-25] 19  SpO2:  [90 %-99 %] 92 %  BP: (116-158)/(71-92) 127/77     Weight: 116.5 kg (256 lb 13.4 oz)  Body mass index is 35.82 kg/m².    Estimated Creatinine Clearance: 117.3 mL/min (based on SCr of 1 mg/dL).    Physical Exam    Significant Labs: {Results:80933}    Significant Imaging: {Imaging Review:89016}

## 2020-03-25 NOTE — H&P
[facility] Hospital Medicine  History & Physical    Patient Name: Chon Abreu  MRN: [id number]  Admission Date: 3/24/2020  Attending Physician: Lynn Kauffman MD   Primary Care Provider: Thee Donato MD         Patient information was obtained from patient and ER records.     Subjective:     Principal Problem:Pneumonia due to Covid-19 Virus    Chief Complaint:   Chief Complaint   Patient presents with    Shortness of Breath     pt came to the ed tonight c.o. SOB and increased work of breathing x today, pt has a positive covid result        HPI: The patient is a 49 y.o. male who was recently diagnosed of confirmed COVID-19 presents c/o of SOB that began today when walking down the stairs. PT also complains of intermittent cough and SOB.    Past Medical History:   Diagnosis Date    Hypertension        Past Surgical History:   Procedure Laterality Date    ANTERIOR CRUCIATE LIGAMENT REPAIR  1990    CATARACT EXTRACTION EXTRACAPSULAR W/ INTRAOCULAR LENS IMPLANTATION  2011    OS    KNEE SURGERY Right        Review of patient's allergies indicates:  No Known Allergies    No current facility-administered medications on file prior to encounter.      Current Outpatient Medications on File Prior to Encounter   Medication Sig    acetaminophen (TYLENOL) 500 MG tablet Take 2 tablets (1,000 mg total) by mouth every 6 (six) hours as needed for Pain or Temperature greater than (100.4).    amlodipine (NORVASC) 10 MG tablet Take 1 tablet (10 mg total) by mouth once daily.    cetirizine (ZYRTEC) 10 MG tablet Take 1 tablet (10 mg total) by mouth once daily.    dextromethorphan-guaifenesin (MUCINEX DM) 60-1,200 mg per 12 hr tablet Take 1 tablet by mouth 2 (two) times daily as needed.    losartan-hydrochlorothiazide 100-25 mg (HYZAAR) 100-25 mg per tablet Take 1 tablet by mouth once daily.    ondansetron (ZOFRAN-ODT) 4 MG TbDL Take 1 tablet (4 mg total) by mouth every 8 (eight) hours as needed.      Family History     Problem Relation (Age of Onset)    Hypertension Mother        Tobacco Use    Smoking status: Former Smoker     Packs/day: 2.00     Years: 21.00     Pack years: 42.00     Types: Cigarettes     Last attempt to quit: 2014     Years since quittin.2    Smokeless tobacco: Never Used   Substance and Sexual Activity    Alcohol use: Yes     Comment: occasionally    Drug use: No    Sexual activity: Yes     Partners: Female     Birth control/protection: None     Review of Systems   Constitutional: Positive for activity change, appetite change and fatigue. Negative for fever.   HENT: Negative for congestion, ear pain and postnasal drip.    Eyes: Negative for discharge.   Respiratory: Positive for cough and shortness of breath. Negative for apnea and wheezing.    Cardiovascular: Negative for chest pain and leg swelling.   Gastrointestinal: Negative for abdominal distention, abdominal pain, nausea and vomiting.   Endocrine: Negative for polydipsia, polyphagia and polyuria.   Genitourinary: Negative for difficulty urinating, flank pain, frequency, hematuria and urgency.   Musculoskeletal: Positive for myalgias. Negative for arthralgias and joint swelling.   Skin: Negative for pallor and rash.   Allergic/Immunologic: Negative for environmental allergies and food allergies.   Neurological: Negative for dizziness, speech difficulty, weakness, light-headedness and headaches.   Hematological: Does not bruise/bleed easily.   Psychiatric/Behavioral: Negative for agitation.     Objective:     Vital Signs (Most Recent):  Temp: 98.1 °F (36.7 °C) (20 0046)  Pulse: 68 (20 0113)  Resp: 18 (20 0046)  BP: (!) 134/91 (20 0046)  SpO2: 96 % (20 2350) Vital Signs (24h Range):  Temp:  [98.1 °F (36.7 °C)-101.5 °F (38.6 °C)] 98.1 °F (36.7 °C)  Pulse:  [68-99] 68  Resp:  [17-25] 18  SpO2:  [90 %-99 %] 96 %  BP: (116-158)/(71-92) 134/91     Weight: 116.5 kg (256 lb 13.4 oz)  Body mass  index is 35.82 kg/m².    Physical Exam   Constitutional: He is oriented to person, place, and time. He appears well-developed and well-nourished.   HENT:   Head: Normocephalic.   Eyes: Conjunctivae are normal.   Neck: Normal range of motion. Neck supple.   Cardiovascular: Normal rate, regular rhythm, normal heart sounds and intact distal pulses.   Pulmonary/Chest: Effort normal. He has decreased breath sounds in the right lower field.   Abdominal: Soft. He exhibits no distension. Bowel sounds are decreased. There is no tenderness.   Musculoskeletal: Normal range of motion.   Neurological: He is alert and oriented to person, place, and time. He has normal strength. GCS eye subscore is 4. GCS verbal subscore is 5. GCS motor subscore is 6.   Skin: Skin is warm and dry.   Psychiatric: He has a normal mood and affect. His speech is normal and behavior is normal.           Significant Labs:   CBC:   Recent Labs   Lab 03/24/20  2138   WBC 6.77   HGB 15.2   HCT 47.7        CMP:   Recent Labs   Lab 03/24/20  2138      K 3.8      CO2 25      BUN 20   CREATININE 1.0   CALCIUM 8.8   PROT 8.2   ALBUMIN 2.9*   BILITOT 1.1*   ALKPHOS 78   AST 44*   ALT 55*   ANIONGAP 14   EGFRNONAA >60       Significant Imaging: I have reviewed all pertinent imaging results/findings within the past 24 hours.    Assessment/Plan:     * Pneumonia due to Covid-19 Virus  CXR- Suboptimal inspiration with mild bilateral atelectasis or infiltrate.  Recommend clinical correlation and follow-up.    Positive for Covid  Oxygen  Consult ID      Essential hypertension  Normotensive currently    Monitor for need for PRNs        VTE Risk Mitigation (From admission, onward)         Ordered     IP VTE HIGH RISK PATIENT  Once      03/25/20 0103     Place sequential compression device  Until discontinued      03/25/20 0103                   Russell Delacruz NP  Department of Hospital Medicine   [facility]

## 2020-03-25 NOTE — PROGRESS NOTES
[facility] Hospital Medicine  Progress Note    Patient Name: Chon Abreu  MRN: [id number]  Patient Class: IP- Inpatient   Admission Date: 3/24/2020  Length of Stay: 1 days  Attending Physician: Michelle Garcia MD  Primary Care Provider: Thee Donato MD        Subjective:     Principal Problem:Pneumonia due to Covid-19 Virus        HPI:  Mr. Abreu is a 49 year old man without significant medical history who presented to the ED with worsening shortness of breath that started today while he was walking down the stairs.  Patient had been seen in the ED on 3/18/20 with fever and shortness of breath and was tested for COVID-19 at that time.  His oxygen saturation was 95% so he was discharged home to wait for his results.  He was notified the test was positive today and he returned to the ED due to the short of breath.  His oxygen saturation had decreased to 90% so he was started on oxygen and admitted.    Significant normal labs included procalcitonin and CBC.  CRP had increased to 135 and transaminases were mildly elevated over normal when checked in the ED last week.  CXR showed a possible infiltrate.    Overview/Hospital Course:  Patient was admitted on IV azithromycin and continued on oxygen.  He had a productive cough and was very short of breath.    Interval History:  He has productive cough, fever, feels terrible.      Review of Systems   Constitutional: Positive for fever. Negative for chills.   Respiratory: Positive for cough and shortness of breath.    Cardiovascular: Negative for chest pain and palpitations.     Objective:     Vital Signs (Most Recent):  Temp: (!) 100.7 °F (38.2 °C) (03/25/20 0842)  Pulse: 86 (03/25/20 0842)  Resp: 19 (03/25/20 0842)  BP: 127/77 (03/25/20 0842)  SpO2: (!) 92 % (03/25/20 0842) Vital Signs (24h Range):  Temp:  [97.5 °F (36.4 °C)-101.5 °F (38.6 °C)] 100.7 °F (38.2 °C)  Pulse:  [66-99] 86  Resp:  [17-25] 19  SpO2:  [90 %-99 %] 92 %  BP:  (116-158)/(71-92) 127/77     Weight: 116.5 kg (256 lb 13.4 oz)  Body mass index is 35.82 kg/m².    Intake/Output Summary (Last 24 hours) at 3/25/2020 0907  Last data filed at 3/24/2020 2216  Gross per 24 hour   Intake 1000 ml   Output --   Net 1000 ml      Physical Exam   Constitutional: He is oriented to person, place, and time. He appears well-developed and well-nourished.   HENT:   Head: Normocephalic.   Eyes: Pupils are equal, round, and reactive to light. Conjunctivae are normal.   Neck: Neck supple. No thyromegaly present.   Cardiovascular: Normal rate, regular rhythm, normal heart sounds and intact distal pulses. Exam reveals no gallop and no friction rub.   No murmur heard.  Pulmonary/Chest:   Poor effort, shallow inspiration, breath sounds decreased throughout.   Abdominal: Soft. Bowel sounds are normal. He exhibits no distension. There is no tenderness.   Musculoskeletal: Normal range of motion. He exhibits no edema.   Lymphadenopathy:     He has no cervical adenopathy.   Neurological: He is alert and oriented to person, place, and time.   Strength equal and symmetric   Skin: Skin is warm and dry. No rash noted.   Psychiatric: He has a normal mood and affect. His behavior is normal. Thought content normal.       Significant Labs: All pertinent labs within the past 24 hours have been reviewed.    Significant Imaging: I have reviewed all pertinent imaging results/findings within the past 24 hours.      Assessment/Plan:      * Pneumonia due to Covid-19 Virus  - CXR- Suboptimal inspiration with mild bilateral atelectasis or infiltrate.    - Patient has positive COVID-19 test.  - Continue supportive care with oxygen and monitor closely for increasing requirement.  - Note he tested positive 7 days ago so hopefully should be on the downward side of infection.   - Azithromycin to treat possible bacterial superinfection.      Essential hypertension  - Takes amlodipine, losartan/HCT  - Normotensive on presentation  and meds held.  Resume if BP increases.      VTE Risk Mitigation (From admission, onward)         Ordered     IP VTE HIGH RISK PATIENT  Once      03/25/20 0103     Place sequential compression device  Until discontinued      03/25/20 0103                      Michelle Llamas MD  Department of Hospital Medicine   [facility]

## 2020-03-25 NOTE — TELEPHONE ENCOUNTER
grantm for pt to call office back in regards of     That we can schedule a video visit at this moment for the pt to speak to Dr. Schwab is he would like a in person visit the appt will need to be schedule out three mothes.

## 2020-03-25 NOTE — HPI
Mr. Abreu is a 49 year old man without significant medical history who presented to the ED with worsening shortness of breath that started today while he was walking down the stairs.  Patient had been seen in the ED on 3/18/20 with fever and shortness of breath and was tested for COVID-19 at that time.  His oxygen saturation was 95% so he was discharged home to wait for his results.  He was notified the test was positive today and he returned to the ED due to the short of breath.  His oxygen saturation had decreased to 90% so he was started on oxygen and admitted.    Significant normal labs included procalcitonin and CBC.  CRP had increased to 135 and transaminases were mildly elevated over normal when checked in the ED last week.  CXR showed a possible infiltrate.

## 2020-03-25 NOTE — ASSESSMENT & PLAN NOTE
CXR- Suboptimal inspiration with mild bilateral atelectasis or infiltrate.  Recommend clinical correlation and follow-up.    Positive for Covid  Oxygen  Consult ID

## 2020-03-25 NOTE — SUBJECTIVE & OBJECTIVE
Interval History:  He has productive cough, fever, feels terrible.      Review of Systems   Constitutional: Positive for fever. Negative for chills.   Respiratory: Positive for cough and shortness of breath.    Cardiovascular: Negative for chest pain and palpitations.     Objective:     Vital Signs (Most Recent):  Temp: (!) 100.7 °F (38.2 °C) (03/25/20 0842)  Pulse: 86 (03/25/20 0842)  Resp: 19 (03/25/20 0842)  BP: 127/77 (03/25/20 0842)  SpO2: (!) 92 % (03/25/20 0842) Vital Signs (24h Range):  Temp:  [97.5 °F (36.4 °C)-101.5 °F (38.6 °C)] 100.7 °F (38.2 °C)  Pulse:  [66-99] 86  Resp:  [17-25] 19  SpO2:  [90 %-99 %] 92 %  BP: (116-158)/(71-92) 127/77     Weight: 116.5 kg (256 lb 13.4 oz)  Body mass index is 35.82 kg/m².    Intake/Output Summary (Last 24 hours) at 3/25/2020 0907  Last data filed at 3/24/2020 2216  Gross per 24 hour   Intake 1000 ml   Output --   Net 1000 ml      Physical Exam   Constitutional: He is oriented to person, place, and time. He appears well-developed and well-nourished.   HENT:   Head: Normocephalic.   Eyes: Pupils are equal, round, and reactive to light. Conjunctivae are normal.   Neck: Neck supple. No thyromegaly present.   Cardiovascular: Normal rate, regular rhythm, normal heart sounds and intact distal pulses. Exam reveals no gallop and no friction rub.   No murmur heard.  Pulmonary/Chest:   Poor effort, shallow inspiration, breath sounds decreased throughout.   Abdominal: Soft. Bowel sounds are normal. He exhibits no distension. There is no tenderness.   Musculoskeletal: Normal range of motion. He exhibits no edema.   Lymphadenopathy:     He has no cervical adenopathy.   Neurological: He is alert and oriented to person, place, and time.   Strength equal and symmetric   Skin: Skin is warm and dry. No rash noted.   Psychiatric: He has a normal mood and affect. His behavior is normal. Thought content normal.       Significant Labs: All pertinent labs within the past 24 hours have been  reviewed.    Significant Imaging: I have reviewed all pertinent imaging results/findings within the past 24 hours.

## 2020-03-25 NOTE — PLAN OF CARE
SW spoke to pt and completed discharge assessment. Pt doesn't have a POA but would like to establish care at [facility] and uses [facility] [address].  Pt doesn't have a POA or LW.  Pt's daughter, Erika will provide transportation home. No needs identified at this time.     03/25/20 1040   Discharge Assessment   Assessment Type Discharge Planning Assessment   Confirmed/corrected address and phone number on facesheet? Yes   Assessment information obtained from? Patient   Communicated expected length of stay with patient/caregiver no   Prior to hospitilization cognitive status: Alert/Oriented   Prior to hospitalization functional status: Independent   Current cognitive status: Alert/Oriented   Current Functional Status: Independent   Lives With alone   Able to Return to Prior Arrangements yes   Is patient able to care for self after discharge? Unable to determine at this time (comments)   Readmission Within the Last 30 Days no previous admission in last 30 days   Patient currently being followed by outpatient case management? No   Patient currently receives any other outside agency services? No   Equipment Currently Used at Home none   Do you have any problems affording any of your prescribed medications? No   Is the patient taking medications as prescribed? yes   Does the patient have transportation home? Yes   Transportation Anticipated family or friend will provide   Does the patient receive services at the [facility]? No   Discharge Plan A Home   DME Needed Upon Discharge  none   Patient/Family in Agreement with Plan yes

## 2020-03-25 NOTE — SUBJECTIVE & OBJECTIVE
Past Medical History:   Diagnosis Date    Hypertension        Past Surgical History:   Procedure Laterality Date    ANTERIOR CRUCIATE LIGAMENT REPAIR      CATARACT EXTRACTION EXTRACAPSULAR W/ INTRAOCULAR LENS IMPLANTATION  2011    OS    KNEE SURGERY Right        Review of patient's allergies indicates:  No Known Allergies    No current facility-administered medications on file prior to encounter.      Current Outpatient Medications on File Prior to Encounter   Medication Sig    acetaminophen (TYLENOL) 500 MG tablet Take 2 tablets (1,000 mg total) by mouth every 6 (six) hours as needed for Pain or Temperature greater than (100.4).    amlodipine (NORVASC) 10 MG tablet Take 1 tablet (10 mg total) by mouth once daily.    cetirizine (ZYRTEC) 10 MG tablet Take 1 tablet (10 mg total) by mouth once daily.    dextromethorphan-guaifenesin (MUCINEX DM) 60-1,200 mg per 12 hr tablet Take 1 tablet by mouth 2 (two) times daily as needed.    losartan-hydrochlorothiazide 100-25 mg (HYZAAR) 100-25 mg per tablet Take 1 tablet by mouth once daily.    ondansetron (ZOFRAN-ODT) 4 MG TbDL Take 1 tablet (4 mg total) by mouth every 8 (eight) hours as needed.     Family History     Problem Relation (Age of Onset)    Hypertension Mother        Tobacco Use    Smoking status: Former Smoker     Packs/day: 2.00     Years: 21.00     Pack years: 42.00     Types: Cigarettes     Last attempt to quit: 2014     Years since quittin.2    Smokeless tobacco: Never Used   Substance and Sexual Activity    Alcohol use: Yes     Comment: occasionally    Drug use: No    Sexual activity: Yes     Partners: Female     Birth control/protection: None     Review of Systems   Constitutional: Positive for activity change, appetite change and fatigue. Negative for fever.   HENT: Negative for congestion, ear pain and postnasal drip.    Eyes: Negative for discharge.   Respiratory: Positive for cough and shortness of breath. Negative for apnea and  wheezing.    Cardiovascular: Negative for chest pain and leg swelling.   Gastrointestinal: Negative for abdominal distention, abdominal pain, nausea and vomiting.   Endocrine: Negative for polydipsia, polyphagia and polyuria.   Genitourinary: Negative for difficulty urinating, flank pain, frequency, hematuria and urgency.   Musculoskeletal: Positive for myalgias. Negative for arthralgias and joint swelling.   Skin: Negative for pallor and rash.   Allergic/Immunologic: Negative for environmental allergies and food allergies.   Neurological: Negative for dizziness, speech difficulty, weakness, light-headedness and headaches.   Hematological: Does not bruise/bleed easily.   Psychiatric/Behavioral: Negative for agitation.     Objective:     Vital Signs (Most Recent):  Temp: 98.1 °F (36.7 °C) (03/25/20 0046)  Pulse: 68 (03/25/20 0113)  Resp: 18 (03/25/20 0046)  BP: (!) 134/91 (03/25/20 0046)  SpO2: 96 % (03/24/20 2350) Vital Signs (24h Range):  Temp:  [98.1 °F (36.7 °C)-101.5 °F (38.6 °C)] 98.1 °F (36.7 °C)  Pulse:  [68-99] 68  Resp:  [17-25] 18  SpO2:  [90 %-99 %] 96 %  BP: (116-158)/(71-92) 134/91     Weight: 116.5 kg (256 lb 13.4 oz)  Body mass index is 35.82 kg/m².    Physical Exam   Constitutional: He is oriented to person, place, and time. He appears well-developed and well-nourished.   HENT:   Head: Normocephalic.   Eyes: Conjunctivae are normal.   Neck: Normal range of motion. Neck supple.   Cardiovascular: Normal rate, regular rhythm, normal heart sounds and intact distal pulses.   Pulmonary/Chest: Effort normal. He has decreased breath sounds in the right lower field.   Abdominal: Soft. He exhibits no distension. Bowel sounds are decreased. There is no tenderness.   Musculoskeletal: Normal range of motion.   Neurological: He is alert and oriented to person, place, and time. He has normal strength. GCS eye subscore is 4. GCS verbal subscore is 5. GCS motor subscore is 6.   Skin: Skin is warm and dry.    Psychiatric: He has a normal mood and affect. His speech is normal and behavior is normal.           Significant Labs:   CBC:   Recent Labs   Lab 03/24/20  2138   WBC 6.77   HGB 15.2   HCT 47.7        CMP:   Recent Labs   Lab 03/24/20  2138      K 3.8      CO2 25      BUN 20   CREATININE 1.0   CALCIUM 8.8   PROT 8.2   ALBUMIN 2.9*   BILITOT 1.1*   ALKPHOS 78   AST 44*   ALT 55*   ANIONGAP 14   EGFRNONAA >60       Significant Imaging: I have reviewed all pertinent imaging results/findings within the past 24 hours.

## 2020-03-25 NOTE — ED TRIAGE NOTES
Pt. Presents to the ER with worsening shortness of breath over the past 2 to 3 days. Pt. States they called him back a couple of days ago with a positive corona virus result. Pt. Came in today as he is having worsening shortness of breath. Pt. Also reports an intermittent productive cough with clear sputum. Pt. Denies fever, weakness, and any other symptoms. Pt. Is otherwise stable at this time. GCS 15. AAOx4. Pt. Ambulatory to bed 6 with slow steady gait due to shortness of breath that is worse with activity. Neurovascular status intact.

## 2020-03-25 NOTE — HPI
"The patient is a 49 y.o. male who was recently diagnosed of confirmed COVID-19 presents c/o of SOB that began today when walking down the stairs. Fever last week when screened. According to the record, he developed worsening SOB. He is now on oxygen supplementation. I am consulted for "COVID." Record reviewed. Patient receiving azithromycin for possible CAP.   "

## 2020-03-25 NOTE — PLAN OF CARE
Pt remained free from falls or injuries this shift. Pt independent in repositioning. No skin breakdown noticed. Pt rested well through the night. PEREZ. Pt on 3L NC

## 2020-03-25 NOTE — ASSESSMENT & PLAN NOTE
- CXR- Suboptimal inspiration with mild bilateral atelectasis or infiltrate.    - Patient has positive COVID-19 test.  - Continue supportive care with oxygen and monitor closely for increasing requirement.  - Note he tested positive 7 days ago so hopefully should be on the downward side of infection.   - Azithromycin to treat possible bacterial superinfection.

## 2020-03-25 NOTE — TELEPHONE ENCOUNTER
----- Message from Krystinlavonne Ponce sent at 3/25/2020  2:29 PM CDT -----  Contact: Eddie Romero   Called in requesting a hospital discharge follow up appointment within one week. Please call patient at [phone] to schedule. Please advise.

## 2020-03-25 NOTE — PLAN OF CARE
New PCP appointment scheduled at [facility] per patient request on April 3 at 8:20 am with Dr. GILMAR Posada.  Information added to AVS.

## 2020-03-25 NOTE — ED PROVIDER NOTES
Encounter Date: 3/24/2020    SCRIBE #1 NOTE: I, Antonette Nixon, am scribing for, and in the presence of, Dr. Reyes.   SCRIBE #2 NOTE: I, Polly Pearson, am scribing for, and in the presence of, Dr. Reyes.     History     Chief Complaint   Patient presents with    Shortness of Breath     pt came to the ed tonight c.o. SOB and increased work of breathing x today, pt has a positive covid result     Time seen by provider: 9:20 PM    This is a 49 y.o. male who was recently diagnosed of confirmed COVID-19 presents c/o of SOB that began today when walking down the stairs. PT also complains of intermittent cough and SOB.    The history is provided by the patient.     Review of patient's allergies indicates:  No Known Allergies  Past Medical History:   Diagnosis Date    Hypertension      Past Surgical History:   Procedure Laterality Date    ANTERIOR CRUCIATE LIGAMENT REPAIR      CATARACT EXTRACTION EXTRACAPSULAR W/ INTRAOCULAR LENS IMPLANTATION  2011    OS    KNEE SURGERY Right      Family History   Problem Relation Age of Onset    Hypertension Mother      Social History     Tobacco Use    Smoking status: Former Smoker     Packs/day: 2.00     Years: 21.00     Pack years: 42.00     Types: Cigarettes     Last attempt to quit: 2014     Years since quittin.2    Smokeless tobacco: Never Used   Substance Use Topics    Alcohol use: Yes     Comment: occasionally    Drug use: No     Review of Systems   Constitutional: Negative for chills, fatigue and fever.   HENT: Negative for sore throat.    Eyes: Negative for visual disturbance.   Respiratory: Positive for cough and shortness of breath. Negative for wheezing.    Cardiovascular: Negative for chest pain.   Gastrointestinal: Negative for diarrhea, nausea and vomiting.   Genitourinary: Negative for dysuria.   Musculoskeletal: Negative for back pain.   Skin: Negative for color change.   Neurological: Negative for dizziness.   Psychiatric/Behavioral: Negative for  confusion.       Physical Exam     Initial Vitals   BP Pulse Resp Temp SpO2   03/24/20 2029 03/24/20 2029 03/24/20 2038 03/24/20 2038 03/24/20 2029   (!) 158/86 99 (!) 25 (!) 101.5 °F (38.6 °C) (!) 90 %      MAP       --                Physical Exam    Nursing note and vitals reviewed.  Constitutional: He appears well-developed and well-nourished. No distress.   HENT:   Head: Normocephalic and atraumatic.   Eyes: Conjunctivae and EOM are normal.   Neck: Normal range of motion. Neck supple.   Cardiovascular: Normal rate, regular rhythm and normal heart sounds. Exam reveals no gallop and no friction rub.    No murmur heard.  Pulmonary/Chest: No respiratory distress. He has no wheezes. He has no rhonchi. He has no rales.   Coarse breath sounds bilaterally. No acute respiratory distress. During exam oxygen saturation was 93% on RA. Oxygen saturation increased to 97% on 2 liter of Oxygen by nasal cannula.   Abdominal: Soft. There is no tenderness. There is no rebound and no guarding.   Musculoskeletal: Normal range of motion. He exhibits no edema or tenderness.   Neurological: He is alert and oriented to person, place, and time. He has normal strength.   Skin: Skin is dry. No rash noted.   Psychiatric: He has a normal mood and affect. His behavior is normal. Judgment and thought content normal.         ED Course   Procedures  Labs Reviewed   CBC W/ AUTO DIFFERENTIAL - Abnormal; Notable for the following components:       Result Value    Mean Corpuscular Hemoglobin 26.3 (*)     Mean Corpuscular Hemoglobin Conc 31.9 (*)     Immature Granulocytes 0.6 (*)     Lymph # 0.4 (*)     Gran% 87.5 (*)     Lymph% 6.1 (*)     All other components within normal limits   COMPREHENSIVE METABOLIC PANEL - Abnormal; Notable for the following components:    Albumin 2.9 (*)     Total Bilirubin 1.1 (*)     AST 44 (*)     ALT 55 (*)     All other components within normal limits   C-REACTIVE PROTEIN - Abnormal; Notable for the following  components:    .0 (*)     All other components within normal limits   PROCALCITONIN     EKG Readings: (Independently Interpreted)   Initial Reading: No STEMI.   Normal sinus rhythm at a rate of 96. No ST/T wave changes.        Imaging Results          X-Ray Chest AP Portable (Final result)  Result time 03/24/20 21:43:58    Final result by Brett Whelan MD (03/24/20 21:43:58)                 Impression:      Suboptimal inspiration with mild bilateral atelectasis or infiltrate.  Recommend clinical correlation and follow-up.      Electronically signed by: Brett Whelan  Date:    03/24/2020  Time:    21:43             Narrative:    EXAMINATION:  XR CHEST AP PORTABLE    CLINICAL HISTORY:  Shortness of breath    TECHNIQUE:  Single frontal view of the chest was performed.    COMPARISON:  03/18/2020    FINDINGS:  Suboptimal inspiration.    Heart is normal size.    Mild airspace disease bilaterally could represent mild atelectasis or infiltrate.    No effusion or pneumothorax.  No focal mass or lobar consolidation.  No acute osseous abnormality.                              X-Rays:   Independently Interpreted Readings:   Chest X-Ray: Bilateral lower lobe infiltrate.     Medical Decision Making:   History:   Old Medical Records: I decided to obtain old medical records.  Independently Interpreted Test(s):   I have ordered and independently interpreted X-rays - see prior notes.  I have ordered and independently interpreted EKG Reading(s) - see prior notes  Clinical Tests:   Lab Tests: Ordered and Reviewed  Radiological Study: Ordered and Reviewed  Medical Tests: Ordered and Reviewed            Scribe Attestation:   Scribe #1: I performed the above scribed service and the documentation accurately describes the services I performed. I attest to the accuracy of the note.  Scribe #2: I performed the above scribed service and the documentation accurately describes the services I performed. I attest to the accuracy of the  note.    Attending Attestation:           Physician Attestation for Scribe:  Physician Attestation Statement for Scribe #1: I, Dr. Akhtar, reviewed documentation, as scribed by Antonette Nixon in my presence, and it is both accurate and complete.   Physician Attestation Statement for Scribe #2: I, Dr. Akhtar, reviewed documentation, as scribed by Polly Pearson in my presence, and it is both accurate and complete. I also acknowledge and confirm the content of the note done by Scribe #1.      Attending ED Notes:   Emergent evaluation a 49-year-old male with positive results for COVID-19 now presents to the emergency department with chief complaint of worsening shortness of breath.  Patient is afebrile, nontoxic-appearing stable vital signs except for oxygen saturation of 92% on room air.  Shortness of breath is worse with exertion and better with rest.  Patient in no acute respiratory distress.  Patient has no elevation white blood cell count.  H&H is 15.2 and 47.7.  No acute findings on CMP.  Patient has elevation in AST and ALT.  Total bili is 1.1.  CRP is 135.  Chest x-ray reveals bibasilar infiltrates.  The patient is extensively counseled on his diagnosis and treatment including all diagnostic, laboratory and physical exam findings.  The patient is admitted in stable condition.          ED Course as of Mar 24 2321   Tue Mar 24, 2020   2238 Discussed case with hospitalist, will admit to Dr. Kauffman.     [LT]      ED Course User Index  [LT] Polly Pearson                Clinical Impression:     1. Hypoxia    2. Shortness of breath    3. SOB (shortness of breath)    4. Elevated C-reactive protein (CRP)    5. Covid-19 Virus Infection    6. Elevated liver enzymes                ED Disposition Condition    Admit                           Roberto Akhtar MD  03/24/20 2321

## 2020-03-25 NOTE — NURSING
Pt arrived to floor from ED. Pt aaox4. Pt O2 sats 89-90% on 3L NC, increased O2 to 4L NC sats 93%. VS otherwise stable. Pt denies pain. Skin intact. Urinal at bedside. Instructed pt not to get out of bed at this time. Admit assessment completed. Pt placed on telemetry, SR

## 2020-03-26 VITALS
OXYGEN SATURATION: 93 % | BODY MASS INDEX: 35.95 KG/M2 | HEIGHT: 71 IN | HEART RATE: 82 BPM | RESPIRATION RATE: 18 BRPM | WEIGHT: 256.81 LBS | TEMPERATURE: 98 F | SYSTOLIC BLOOD PRESSURE: 130 MMHG | DIASTOLIC BLOOD PRESSURE: 74 MMHG

## 2020-03-26 PROCEDURE — 63600175 PHARM REV CODE 636 W HCPCS: Performed by: HOSPITALIST

## 2020-03-26 PROCEDURE — 99238 HOSP IP/OBS DSCHRG MGMT 30/<: CPT | Mod: ,,, | Performed by: HOSPITALIST

## 2020-03-26 PROCEDURE — 99238 PR HOSPITAL DISCHARGE DAY,<30 MIN: ICD-10-PCS | Mod: ,,, | Performed by: HOSPITALIST

## 2020-03-26 RX ADMIN — AZITHROMYCIN MONOHYDRATE 500 MG: 500 INJECTION, POWDER, LYOPHILIZED, FOR SOLUTION INTRAVENOUS at 09:03

## 2020-03-26 NOTE — PLAN OF CARE
Problem: Adult Inpatient Plan of Care  Goal: Plan of Care Review  Outcome: Ongoing, Progressing  Goal: Patient-Specific Goal (Individualization)  Outcome: Ongoing, Progressing  Goal: Absence of Hospital-Acquired Illness or Injury  Outcome: Ongoing, Progressing  Goal: Optimal Comfort and Wellbeing  Outcome: Ongoing, Progressing  Goal: Readiness for Transition of Care  Outcome: Ongoing, Progressing  Goal: Rounds/Family Conference  Outcome: Ongoing, Progressing     Problem: ARDS (Acute Respiratory Distress Syndrome)  Goal: Effective Oxygenation  Outcome: Ongoing, Progressing

## 2020-03-26 NOTE — DISCHARGE SUMMARY
[facility] Hospital Medicine  Discharge Summary      Patient Name: Chon Abreu  MRN: [id number]  Admission Date: 3/24/2020  Hospital Length of Stay: 2 days  Discharge Date and Time:  3/26/2020  Attending Physician: Michelle Garcia MD   Discharging Provider: Michelle Garcia MD  Primary Care Provider: Antonio Schwab MD      HPI:   Mr. Abreu is a 49 year old man without significant medical history who presented to the ED with worsening shortness of breath that started today while he was walking down the stairs.  Patient had been seen in the ED on 3/18/20 with fever and shortness of breath and was tested for COVID-19 at that time.  His oxygen saturation was 95% so he was discharged home to wait for his results.  He was notified the test was positive today and he returned to the ED due to the short of breath.  His oxygen saturation had decreased to 90% so he was started on oxygen and admitted.    Significant normal labs included procalcitonin and CBC.  CRP had increased to 135 and transaminases were mildly elevated over normal when checked in the ED last week.  CXR showed a possible infiltrate.          Hospital Course:   Patient was admitted on IV azithromycin and continued on oxygen.  He had a productive cough and initially was very short of breath.  His blood pressure was somewhat elevated sometimes but usually was normal, and he reported he was no longer taking medication for HTN.  He should follow up with his PCP after his quarantine is complete to determine the need to treat his HTN.  He was afebrile on discharge and was no longer requiring supplemental oxygen.  He was given information regarding his expected length of quarantine prior to discharge.         Final Active Diagnoses:    Diagnosis Date Noted POA    PRINCIPAL PROBLEM:  Pneumonia due to Covid-19 Virus [J12.89, B97.29] 03/24/2020 Yes    Essential hypertension [I10] 04/10/2017 Yes      Problems Resolved During this  Admission:       Discharged Condition: stable    Disposition: Home or Self Care    Follow Up:  Follow-up Information     Antonio Schwab MD. Go on 4/3/2020.    Specialty:  Internal Medicine  Why:  at 8:20 am for hospital followup appointment with new [facility] PCP  Contact information:  [address]  [address]  [facility] [address]  [phone]                 Patient Instructions:      Diet Adult Regular     Activity as tolerated       Medications:  Reconciled Home Medications:      Medication List      CONTINUE taking these medications    acetaminophen 500 MG tablet  Commonly known as:  TYLENOL  Take 2 tablets (1,000 mg total) by mouth every 6 (six) hours as needed for Pain or Temperature greater than (100.4).     ondansetron 4 MG Tbdl  Commonly known as:  ZOFRAN-ODT  Take 1 tablet (4 mg total) by mouth every 8 (eight) hours as needed.        STOP taking these medications    amLODIPine 10 MG tablet  Commonly known as:  NORVASC     cetirizine 10 MG tablet  Commonly known as:  ZYRTEC     dextromethorphan-guaifenesin 60-1,200 mg per 12 hr tablet  Commonly known as:  MUCINEX DM     losartan-hydrochlorothiazide 100-25 mg 100-25 mg per tablet  Commonly known as:  HYZAAR            Time spent on the discharge of patient: <30minutes  Patient was seen and examined on the date of discharge and determined to be suitable for discharge.         Michelle Llamas MD  Department of Hospital Medicine  [facility]

## 2020-03-26 NOTE — NURSING
Patient is awake, alert, and oriented.  Vital signs are WNL.  Peripheral IV has been discontinued.  Family is here for pick-up.  Patient is being discharge home with no services.  Follow-up appointment has been made.   Discharge instructions have been reviewed.  Patient was tested positive for COVID-19. Education was given for home safety. Disposable mask was provided for departure.  Patient verbalized understanding.  No changes in medication.  Patient ambulated in room efficiently. Denies shortness of breath.  All questions were answered.  Patient will be provided wheelchair assistance to private vehicle.

## 2020-03-26 NOTE — NURSING
AAOx4. Plan of care reviewed. Mild respiratory distress noted. SpO2 maintained at 93% on 4 L nasal cannula w/ humidification. Cardiac monitor in place. Vitals available in flow sheet. Urinal provided. Bed locked/lowest position, side rails up x 2, nonskid socks when out of bed, call light within reach. Patient encouraged to call for assistance when needed.

## 2020-03-26 NOTE — PLAN OF CARE
Pt states he has transportation home and will be returning home independently.    No DC needs from CM perspective.       03/26/20 1056   Final Note   Assessment Type Final Discharge Note   Anticipated Discharge Disposition Home   What phone number can be called within the next 1-3 days to see how you are doing after discharge? [phone]   Hospital Follow Up  Appt(s) scheduled? Yes   Discharge plans and expectations educations in teach back method with documentation complete? Yes   Right Care Referral Info   Post Acute Recommendation No Care

## 2020-03-31 ENCOUNTER — TELEPHONE (OUTPATIENT)
Dept: INTERNAL MEDICINE | Facility: CLINIC | Age: 50
End: 2020-03-31

## 2020-03-31 NOTE — TELEPHONE ENCOUNTER
----- Message from Nain Rodgers sent at 3/30/2020  5:14 PM CDT -----  Contact: pt   Pt tested positive has quarantine for 14 days need to schedule f/up appointment to return to work       Please call pt to assist [phone]

## 2020-04-06 ENCOUNTER — TELEPHONE (OUTPATIENT)
Dept: PRIMARY CARE CLINIC | Facility: CLINIC | Age: 50
End: 2020-04-06

## 2020-04-06 NOTE — TELEPHONE ENCOUNTER
Patient sent my chart message to make his appointment on Tuesday has been changed to a virtual visit

## 2020-04-06 NOTE — TELEPHONE ENCOUNTER
Please call this patient about tomorrow's telemedicine visit with me.  He is a patient of Dr. Schwab.  He was hospitalized with Covid-19 pneumonia.  He is supposed to have a follow up with his PCP, not me

## 2020-04-06 NOTE — TELEPHONE ENCOUNTER
I have tried several times to contact patient left voice message and my chart message also. Will continue to contact patient before appointment time

## 2020-04-06 NOTE — TELEPHONE ENCOUNTER
Please refer to my earlier message:  This patient was hospitalized with Pneumonia/Covid-19.  This is not an appropriate telemedicine appointment since he needs to follow up with his PCP, Dr. Schwab, not me

## 2020-04-07 ENCOUNTER — OFFICE VISIT (OUTPATIENT)
Dept: INTERNAL MEDICINE | Facility: CLINIC | Age: 50
End: 2020-04-07
Attending: FAMILY MEDICINE
Payer: COMMERCIAL

## 2020-04-07 ENCOUNTER — TELEPHONE (OUTPATIENT)
Dept: INTERNAL MEDICINE | Facility: CLINIC | Age: 50
End: 2020-04-07

## 2020-04-07 DIAGNOSIS — J12.82 PNEUMONIA DUE TO COVID-19 VIRUS: Primary | ICD-10-CM

## 2020-04-07 DIAGNOSIS — U07.1 PNEUMONIA DUE TO COVID-19 VIRUS: Primary | ICD-10-CM

## 2020-04-07 PROCEDURE — 99214 OFFICE O/P EST MOD 30 MIN: CPT | Mod: 95,,, | Performed by: FAMILY MEDICINE

## 2020-04-07 PROCEDURE — 99214 PR OFFICE/OUTPT VISIT, EST, LEVL IV, 30-39 MIN: ICD-10-PCS | Mod: 95,,, | Performed by: FAMILY MEDICINE

## 2020-04-07 NOTE — LETTER
April 7, 2020      [facility] [address]  [facility] [address]  Phone: [phone]  Fax: [phone]       Patient: Chon Abreu   YOB: 1970  Date of Visit: 04/07/2020    To Whom It May Concern:    Carlos A Abreu  was at [facility] on 04/07/2020. He may return to work on 4/13/2020 with no restrictions. If you have any questions or concerns, or if I can be of further assistance, please do not hesitate to contact me.    Sincerely,    Francis Saenz MD

## 2020-04-07 NOTE — TELEPHONE ENCOUNTER
----- Message from Cristiano Geiger sent at 4/7/2020  8:36 AM CDT -----  Contact: CLARIBEL RIDDLE [[id number]]  Name of Who is Calling: CLARIBEL RIDDLE [[id number]]    What is the request in detail:CLARIBEL RIDDLE [[id number]] is requesting a call backin regards to hospital follow up ... After covid to return to work ...  Please contact to further discuss and advise      Can the clinic reply by [facility]: yes     [facility]  [phone] (home)

## 2020-04-07 NOTE — PROGRESS NOTES
The patient location is: Home  The chief complaint leading to consultation is: RTW  Visit type: Virtual visit with synchronous audio and video  Total time spent with patient:  4 min  Each patient to whom he or she provides medical services by telemedicine is:  (1) informed of the relationship between the physician and patient and the respective role of any other health care provider with respect to management of the patient; and (2) notified that he or she may decline to receive medical services by telemedicine and may withdraw from such care at any time.    Notes: This patient was seen during the COVID-19 pandemic.  CHIEF COMPLAINT: Establish a primary care physician    HISTORY OF PRESENT ILLNESS: The patient is a generally healthy 49 year-old BM.  The patient was recently admitted with COVID-19 pneumonia and hypoxia.  He spent a couple of days in the hospital and was discharged on no medications.  He is currently doing well and feels much better.  He would like to go back to work.    REVIEW OF SYSTEMS:  GENERAL: No fever, chills, fatigability or weight loss.  SKIN: No rashes, itching or changes in color or texture of skin.  HEAD: No headaches or recent head trauma.  EYES: Visual acuity fine. No photophobia, ocular pain or diplopia.  EARS: Denies ear pain, discharge or vertigo.  NOSE: No loss of smell, no epistaxis or postnasal drip.  MOUTH & THROAT: No hoarseness or change in voice. No excessive gum bleeding.  NODES: Denies swollen glands.  CHEST: Denies PEREZ, cyanosis, wheezing, cough and sputum production.  CARDIOVASCULAR: Denies chest pain, PND, orthopnea or reduced exercise tolerance.  ABDOMEN: Appetite fine. No weight loss. Denies diarrhea, abdominal pain, hematemesis or blood in stool.  URINARY: No flank pain, dysuria or hematuria.  PERIPHERAL VASCULAR: No claudication or cyanosis.  MUSCULOSKELETAL: No joint stiffness or swelling. Denies back pain.  NEUROLOGIC: No history of seizures, paralysis, alteration of  gait or coordination.    SOCIAL HISTORY: The patient does not smoke.  The patient consumes alcohol socially.  The patient is single.    PHYSICAL EXAMINATION:   APPEARANCE: Well nourished, well developed, in no acute distress.    PSYCHIATRIC: Patient is alert and oriented x3.  Thought processes are all normal.  There is no homicidality.  There is no suicidality.  There is no evidence of psychosis.    LABORATORY/RADIOLOGY:   Chart reviewed from recent hospitalization.      ASSESSMENT:   COVID-19 pneumonia    PLAN:  He seems to be doing quite well and has recovered from COVID-19 quite nicely  Return to work 4/13/2020

## 2020-04-09 ENCOUNTER — PATIENT MESSAGE (OUTPATIENT)
Dept: INTERNAL MEDICINE | Facility: CLINIC | Age: 50
End: 2020-04-09

## 2021-04-16 ENCOUNTER — PATIENT MESSAGE (OUTPATIENT)
Dept: RESEARCH | Facility: HOSPITAL | Age: 51
End: 2021-04-16